# Patient Record
Sex: MALE | Race: WHITE | URBAN - METROPOLITAN AREA
[De-identification: names, ages, dates, MRNs, and addresses within clinical notes are randomized per-mention and may not be internally consistent; named-entity substitution may affect disease eponyms.]

---

## 2023-11-09 ENCOUNTER — INPATIENT (INPATIENT)
Facility: HOSPITAL | Age: 63
LOS: 0 days | Discharge: HOME | DRG: 694 | End: 2023-11-10
Attending: STUDENT IN AN ORGANIZED HEALTH CARE EDUCATION/TRAINING PROGRAM | Admitting: STUDENT IN AN ORGANIZED HEALTH CARE EDUCATION/TRAINING PROGRAM
Payer: COMMERCIAL

## 2023-11-09 VITALS
DIASTOLIC BLOOD PRESSURE: 77 MMHG | WEIGHT: 176.81 LBS | HEART RATE: 78 BPM | OXYGEN SATURATION: 98 % | SYSTOLIC BLOOD PRESSURE: 132 MMHG | TEMPERATURE: 98 F | RESPIRATION RATE: 20 BRPM

## 2023-11-09 LAB
ALBUMIN SERPL ELPH-MCNC: 3.7 G/DL — SIGNIFICANT CHANGE UP (ref 3.3–5.2)
ALP SERPL-CCNC: 92 U/L — SIGNIFICANT CHANGE UP (ref 40–120)
ALT FLD-CCNC: 20 U/L — SIGNIFICANT CHANGE UP
ANION GAP SERPL CALC-SCNC: 15 MMOL/L — SIGNIFICANT CHANGE UP (ref 5–17)
AST SERPL-CCNC: 31 U/L — SIGNIFICANT CHANGE UP
BASOPHILS # BLD AUTO: 0.03 K/UL — SIGNIFICANT CHANGE UP (ref 0–0.2)
BASOPHILS NFR BLD AUTO: 0.2 % — SIGNIFICANT CHANGE UP (ref 0–2)
BILIRUB SERPL-MCNC: 1.2 MG/DL — SIGNIFICANT CHANGE UP (ref 0.4–2)
BUN SERPL-MCNC: 19.9 MG/DL — SIGNIFICANT CHANGE UP (ref 8–20)
CALCIUM SERPL-MCNC: 8.9 MG/DL — SIGNIFICANT CHANGE UP (ref 8.4–10.5)
CHLORIDE SERPL-SCNC: 93 MMOL/L — LOW (ref 96–108)
CO2 SERPL-SCNC: 22 MMOL/L — SIGNIFICANT CHANGE UP (ref 22–29)
CREAT SERPL-MCNC: 1.69 MG/DL — HIGH (ref 0.5–1.3)
EGFR: 45 ML/MIN/1.73M2 — LOW
EOSINOPHIL # BLD AUTO: 0.01 K/UL — SIGNIFICANT CHANGE UP (ref 0–0.5)
EOSINOPHIL NFR BLD AUTO: 0.1 % — SIGNIFICANT CHANGE UP (ref 0–6)
GLUCOSE SERPL-MCNC: 118 MG/DL — HIGH (ref 70–99)
HCT VFR BLD CALC: 49.1 % — SIGNIFICANT CHANGE UP (ref 39–50)
HGB BLD-MCNC: 16.1 G/DL — SIGNIFICANT CHANGE UP (ref 13–17)
IMM GRANULOCYTES NFR BLD AUTO: 0.5 % — SIGNIFICANT CHANGE UP (ref 0–0.9)
LYMPHOCYTES # BLD AUTO: 0.86 K/UL — LOW (ref 1–3.3)
LYMPHOCYTES # BLD AUTO: 5 % — LOW (ref 13–44)
MCHC RBC-ENTMCNC: 27.4 PG — SIGNIFICANT CHANGE UP (ref 27–34)
MCHC RBC-ENTMCNC: 32.8 GM/DL — SIGNIFICANT CHANGE UP (ref 32–36)
MCV RBC AUTO: 83.5 FL — SIGNIFICANT CHANGE UP (ref 80–100)
MONOCYTES # BLD AUTO: 1.28 K/UL — HIGH (ref 0–0.9)
MONOCYTES NFR BLD AUTO: 7.4 % — SIGNIFICANT CHANGE UP (ref 2–14)
NEUTROPHILS # BLD AUTO: 14.97 K/UL — HIGH (ref 1.8–7.4)
NEUTROPHILS NFR BLD AUTO: 86.8 % — HIGH (ref 43–77)
PLATELET # BLD AUTO: 281 K/UL — SIGNIFICANT CHANGE UP (ref 150–400)
POTASSIUM SERPL-MCNC: 4.8 MMOL/L — SIGNIFICANT CHANGE UP (ref 3.5–5.3)
POTASSIUM SERPL-SCNC: 4.8 MMOL/L — SIGNIFICANT CHANGE UP (ref 3.5–5.3)
PROT SERPL-MCNC: 7.9 G/DL — SIGNIFICANT CHANGE UP (ref 6.6–8.7)
RBC # BLD: 5.88 M/UL — HIGH (ref 4.2–5.8)
RBC # FLD: 16.9 % — HIGH (ref 10.3–14.5)
SODIUM SERPL-SCNC: 130 MMOL/L — LOW (ref 135–145)
WBC # BLD: 17.23 K/UL — HIGH (ref 3.8–10.5)
WBC # FLD AUTO: 17.23 K/UL — HIGH (ref 3.8–10.5)

## 2023-11-09 PROCEDURE — 76770 US EXAM ABDO BACK WALL COMP: CPT | Mod: 26

## 2023-11-09 PROCEDURE — 99285 EMERGENCY DEPT VISIT HI MDM: CPT | Mod: FS

## 2023-11-09 RX ORDER — KETOROLAC TROMETHAMINE 30 MG/ML
15 SYRINGE (ML) INJECTION ONCE
Refills: 0 | Status: DISCONTINUED | OUTPATIENT
Start: 2023-11-09 | End: 2023-11-09

## 2023-11-09 RX ORDER — MORPHINE SULFATE 50 MG/1
4 CAPSULE, EXTENDED RELEASE ORAL ONCE
Refills: 0 | Status: DISCONTINUED | OUTPATIENT
Start: 2023-11-09 | End: 2023-11-09

## 2023-11-09 RX ADMIN — Medication 15 MILLIGRAM(S): at 22:23

## 2023-11-09 RX ADMIN — MORPHINE SULFATE 4 MILLIGRAM(S): 50 CAPSULE, EXTENDED RELEASE ORAL at 22:21

## 2023-11-09 NOTE — ED STATDOCS - OBJECTIVE STATEMENT
64 y/o male with PMHx of MS, previous kidney stones with recent diagnoses of 7 mm kidney stone. Pt states pain started yesterday. Pt went to [facility] today and was sent for Ct scan around 3:00 pm. Pt was giving hydrocodone by urologist. Pt states he has some trouble urinating. Pt states his urine is orange in color. Pt states he is nauseous,  Pt  took 3 hydrocodone without improvement in pain.  Pt had a stone before that was treated with lithotripsy. 64 y/o male with PMHx of MS, previous kidney stones with recent diagnoses of 7 mm kidney stone. Pt states pain started yesterday. Pt went to [facility] today and was sent for Ct scan around 3:00 pm. Pt was giving hydrocodone by urologist. Pt states he has some trouble urinating. Pt states his urine is orange in color. Pt states he is nauseous,  Pt  took 3 hydrocodone without improvement in pain.  Pt had a stone before that was treated with lithotripsy. 64 y/o male with PMHx of MS, previous kidney stones with recent diagnoses of 7 mm kidney stone. Pt states pain started yesterday. Pt went to [facility] today and was sent for Ct scan around 3:00 pm. Pt was giving hydrocodone by urologist. Pt states he has some trouble urinating. Pt states his urine is orange in color. Pt states he is nauseous,  Pt  took 3 hydrocodone without improvement in pain.  Pt had a stone before that was treated with lithotripsy. 64 y/o male with PMHx of MS, previous kidney stones with recent diagnoses of 7x5mm kidney stone in the proximal ureter. Pt states pain started yesterday. Pt went to [facility] today and was sent for Ct scan around 3:00 pm. Pt was giving hydrocodone by urologist. Pt states he has some trouble urinating. Pt states his urine is orange in color. Pt states he is nauseous,  Pt  took 3 hydrocodone without improvement in pain.  Pt had a stone before that was treated with lithotripsy. 64 y/o male with PMHx of MS, previous kidney stones with recent diagnoses of 7x5mm kidney stone in the proximal ureter. Pt states pain started yesterday. Pt went to [facility] today and was sent for Ct scan around 3:00 pm. Pt was giving hydrocodone by urologist. Pt states he has some trouble urinating. Pt states his urine is orange in color. Pt states he is nauseous,  Pt  took 3 hydrocodone without improvement in pain.  Pt had a stone before that was treated with lithotripsy. 62 y/o male with PMHx of MS, previous kidney stones with recent diagnoses of 7x5mm kidney stone in the proximal ureter. Pt states pain started yesterday. Pt went to [facility] today and was sent for Ct scan around 3:00 pm. Pt was giving hydrocodone by urologist. Pt states he has some trouble urinating. Pt states his urine is orange in color. Pt states he is nauseous,  Pt  took 3 hydrocodone without improvement in pain.  Pt had a stone before that was treated with lithotripsy. 64 y/o male with PMHx of MS, previous kidney stones with recent diagnoses of 7x5mm kidney stone in the proximal ureter. Pt states pain started yesterday. Pt went to [facility] today and was sent for Ct scan around 3:00 pm. Pt was giving hydrocodone by urologist. Pt states he has some trouble urinating. Pt states his urine is orange in color. Pt states he is nauseous,  Pt took 3 hydrocodone without improvement in pain.  Pt had a stone before that was treated with lithotripsy. 64 y/o male with PMHx of MS, previous kidney stones with recent diagnoses of 7x5mm kidney stone in the proximal ureter. Pt states pain started yesterday. Pt went to [facility] today and was sent for Ct scan around 3:00 pm. Pt was giving hydrocodone by urologist. Pt states he has some trouble urinating. Pt states his urine is orange in color. Pt states he is nauseous,  Pt took 3 hydrocodone without improvement in pain.  Pt had a stone before that was treated with lithotripsy. 62 y/o male with PMHx of MS, previous kidney stones with recent diagnoses of 7x5mm kidney stone in the proximal ureter. Pt states pain started yesterday. Pt went to [facility] today and was sent for Ct scan around 3:00 pm. Pt was giving hydrocodone by urologist. Pt states he has some trouble urinating. Pt states his urine is orange in color. Pt states he is nauseous,  Pt took 3 hydrocodone without improvement in pain.  Pt had a stone before that was treated with lithotripsy.

## 2023-11-09 NOTE — ED STATDOCS - CLINICAL SUMMARY MEDICAL DECISION MAKING FREE TEXT BOX
Pt with recent diagnose of 7mm right kidney stone presenting with intractable pain. Will obtain UA, sonogram, pain control and reassess.

## 2023-11-10 ENCOUNTER — TRANSCRIPTION ENCOUNTER (OUTPATIENT)
Age: 63
End: 2023-11-10

## 2023-11-10 VITALS
DIASTOLIC BLOOD PRESSURE: 78 MMHG | TEMPERATURE: 99 F | SYSTOLIC BLOOD PRESSURE: 152 MMHG | RESPIRATION RATE: 18 BRPM | HEART RATE: 70 BPM | OXYGEN SATURATION: 97 %

## 2023-11-10 DIAGNOSIS — N20.0 CALCULUS OF KIDNEY: ICD-10-CM

## 2023-11-10 PROBLEM — Z00.00 ENCOUNTER FOR PREVENTIVE HEALTH EXAMINATION: Status: ACTIVE | Noted: 2023-11-10

## 2023-11-10 LAB
ANION GAP SERPL CALC-SCNC: 10 MMOL/L — SIGNIFICANT CHANGE UP (ref 5–17)
APPEARANCE UR: CLEAR — SIGNIFICANT CHANGE UP
BACTERIA # UR AUTO: NEGATIVE /HPF — SIGNIFICANT CHANGE UP
BILIRUB UR-MCNC: NEGATIVE — SIGNIFICANT CHANGE UP
BUN SERPL-MCNC: 19 MG/DL — SIGNIFICANT CHANGE UP (ref 8–20)
CALCIUM SERPL-MCNC: 8.3 MG/DL — LOW (ref 8.4–10.5)
CAST: 0 /LPF — SIGNIFICANT CHANGE UP (ref 0–4)
CHLORIDE SERPL-SCNC: 98 MMOL/L — SIGNIFICANT CHANGE UP (ref 96–108)
CO2 SERPL-SCNC: 27 MMOL/L — SIGNIFICANT CHANGE UP (ref 22–29)
COLOR SPEC: YELLOW — SIGNIFICANT CHANGE UP
CREAT SERPL-MCNC: 1.66 MG/DL — HIGH (ref 0.5–1.3)
DIFF PNL FLD: ABNORMAL
EGFR: 46 ML/MIN/1.73M2 — LOW
GLUCOSE SERPL-MCNC: 99 MG/DL — SIGNIFICANT CHANGE UP (ref 70–99)
GLUCOSE UR QL: NEGATIVE MG/DL — SIGNIFICANT CHANGE UP
HCT VFR BLD CALC: 43.5 % — SIGNIFICANT CHANGE UP (ref 39–50)
HGB BLD-MCNC: 14.6 G/DL — SIGNIFICANT CHANGE UP (ref 13–17)
KETONES UR-MCNC: NEGATIVE MG/DL — SIGNIFICANT CHANGE UP
LEUKOCYTE ESTERASE UR-ACNC: NEGATIVE — SIGNIFICANT CHANGE UP
MCHC RBC-ENTMCNC: 28.6 PG — SIGNIFICANT CHANGE UP (ref 27–34)
MCHC RBC-ENTMCNC: 33.6 GM/DL — SIGNIFICANT CHANGE UP (ref 32–36)
MCV RBC AUTO: 85.1 FL — SIGNIFICANT CHANGE UP (ref 80–100)
NITRITE UR-MCNC: NEGATIVE — SIGNIFICANT CHANGE UP
PH UR: 6.5 — SIGNIFICANT CHANGE UP (ref 5–8)
PLATELET # BLD AUTO: 231 K/UL — SIGNIFICANT CHANGE UP (ref 150–400)
POTASSIUM SERPL-MCNC: 4.3 MMOL/L — SIGNIFICANT CHANGE UP (ref 3.5–5.3)
POTASSIUM SERPL-SCNC: 4.3 MMOL/L — SIGNIFICANT CHANGE UP (ref 3.5–5.3)
PROT UR-MCNC: NEGATIVE MG/DL — SIGNIFICANT CHANGE UP
RBC # BLD: 5.11 M/UL — SIGNIFICANT CHANGE UP (ref 4.2–5.8)
RBC # FLD: 16.9 % — HIGH (ref 10.3–14.5)
RBC CASTS # UR COMP ASSIST: 0 /HPF — SIGNIFICANT CHANGE UP (ref 0–4)
SODIUM SERPL-SCNC: 135 MMOL/L — SIGNIFICANT CHANGE UP (ref 135–145)
SP GR SPEC: 1.01 — SIGNIFICANT CHANGE UP (ref 1–1.03)
SQUAMOUS # UR AUTO: 0 /HPF — SIGNIFICANT CHANGE UP (ref 0–5)
UROBILINOGEN FLD QL: 0.2 MG/DL — SIGNIFICANT CHANGE UP (ref 0.2–1)
WBC # BLD: 13.47 K/UL — HIGH (ref 3.8–10.5)
WBC # FLD AUTO: 13.47 K/UL — HIGH (ref 3.8–10.5)
WBC UR QL: 0 /HPF — SIGNIFICANT CHANGE UP (ref 0–5)

## 2023-11-10 PROCEDURE — 76770 US EXAM ABDO BACK WALL COMP: CPT

## 2023-11-10 PROCEDURE — 81001 URINALYSIS AUTO W/SCOPE: CPT

## 2023-11-10 PROCEDURE — 99233 SBSQ HOSP IP/OBS HIGH 50: CPT | Mod: FS

## 2023-11-10 PROCEDURE — 96374 THER/PROPH/DIAG INJ IV PUSH: CPT

## 2023-11-10 PROCEDURE — 96375 TX/PRO/DX INJ NEW DRUG ADDON: CPT

## 2023-11-10 PROCEDURE — 80048 BASIC METABOLIC PNL TOTAL CA: CPT

## 2023-11-10 PROCEDURE — 36415 COLL VENOUS BLD VENIPUNCTURE: CPT

## 2023-11-10 PROCEDURE — 99285 EMERGENCY DEPT VISIT HI MDM: CPT | Mod: 25

## 2023-11-10 PROCEDURE — 80053 COMPREHEN METABOLIC PANEL: CPT

## 2023-11-10 PROCEDURE — 85025 COMPLETE CBC W/AUTO DIFF WBC: CPT

## 2023-11-10 PROCEDURE — 85027 COMPLETE CBC AUTOMATED: CPT

## 2023-11-10 PROCEDURE — 87086 URINE CULTURE/COLONY COUNT: CPT

## 2023-11-10 RX ORDER — ACETAMINOPHEN 500 MG
1000 TABLET ORAL ONCE
Refills: 0 | Status: DISCONTINUED | OUTPATIENT
Start: 2023-11-10 | End: 2023-11-10

## 2023-11-10 RX ORDER — HYDROMORPHONE HYDROCHLORIDE 2 MG/ML
1 INJECTION INTRAMUSCULAR; INTRAVENOUS; SUBCUTANEOUS EVERY 6 HOURS
Refills: 0 | Status: DISCONTINUED | OUTPATIENT
Start: 2023-11-10 | End: 2023-11-10

## 2023-11-10 RX ORDER — TAMSULOSIN HYDROCHLORIDE 0.4 MG/1
1 CAPSULE ORAL
Qty: 30 | Refills: 0
Start: 2023-11-10 | End: 2023-12-09

## 2023-11-10 RX ORDER — SODIUM CHLORIDE 9 MG/ML
1000 INJECTION, SOLUTION INTRAVENOUS
Refills: 0 | Status: DISCONTINUED | OUTPATIENT
Start: 2023-11-10 | End: 2023-11-10

## 2023-11-10 RX ORDER — HYDROMORPHONE HYDROCHLORIDE 2 MG/ML
0.5 INJECTION INTRAMUSCULAR; INTRAVENOUS; SUBCUTANEOUS EVERY 6 HOURS
Refills: 0 | Status: DISCONTINUED | OUTPATIENT
Start: 2023-11-10 | End: 2023-11-10

## 2023-11-10 RX ORDER — HYDROMORPHONE HYDROCHLORIDE 2 MG/ML
0.2 INJECTION INTRAMUSCULAR; INTRAVENOUS; SUBCUTANEOUS EVERY 6 HOURS
Refills: 0 | Status: DISCONTINUED | OUTPATIENT
Start: 2023-11-10 | End: 2023-11-10

## 2023-11-10 RX ORDER — IBUPROFEN 200 MG
600 TABLET ORAL EVERY 6 HOURS
Refills: 0 | Status: DISCONTINUED | OUTPATIENT
Start: 2023-11-10 | End: 2023-11-10

## 2023-11-10 RX ORDER — CEPHALEXIN 500 MG
1 CAPSULE ORAL
Qty: 9 | Refills: 0
Start: 2023-11-10 | End: 2023-11-12

## 2023-11-10 RX ORDER — KETOROLAC TROMETHAMINE 30 MG/ML
15 SYRINGE (ML) INJECTION ONCE
Refills: 0 | Status: DISCONTINUED | OUTPATIENT
Start: 2023-11-10 | End: 2023-11-10

## 2023-11-10 RX ORDER — ASPIRIN/CALCIUM CARB/MAGNESIUM 324 MG
81 TABLET ORAL DAILY
Refills: 0 | Status: DISCONTINUED | OUTPATIENT
Start: 2023-11-10 | End: 2023-11-10

## 2023-11-10 RX ORDER — KETOROLAC TROMETHAMINE 30 MG/ML
15 SYRINGE (ML) INJECTION EVERY 6 HOURS
Refills: 0 | Status: DISCONTINUED | OUTPATIENT
Start: 2023-11-10 | End: 2023-11-10

## 2023-11-10 RX ORDER — TAMSULOSIN HYDROCHLORIDE 0.4 MG/1
0.4 CAPSULE ORAL AT BEDTIME
Refills: 0 | Status: DISCONTINUED | OUTPATIENT
Start: 2023-11-10 | End: 2023-11-10

## 2023-11-10 RX ADMIN — Medication 81 MILLIGRAM(S): at 12:44

## 2023-11-10 RX ADMIN — SODIUM CHLORIDE 125 MILLILITER(S): 9 INJECTION, SOLUTION INTRAVENOUS at 06:17

## 2023-11-10 NOTE — DISCHARGE NOTE PROVIDER - NSDCMRMEDTOKEN_GEN_ALL_CORE_FT
cephalexin 500 mg oral capsule: 1 cap(s) orally 3 times a day  ibuprofen 200 mg oral tablet: 3 tab(s) orally every 6 hours as needed for pain  tamsulosin 0.4 mg oral capsule: 1 cap(s) orally once a day (at bedtime)   ibuprofen 200 mg oral tablet: 3 tab(s) orally every 6 hours as needed for pain  tamsulosin 0.4 mg oral capsule: 1 cap(s) orally once a day (at bedtime)

## 2023-11-10 NOTE — H&P ADULT - HISTORY OF PRESENT ILLNESS
Patient is a 62 yo M with mhx of MS, and polycythemia vera, which he goes 1x a month for phlebotomy. Patient states 2 days ago he was having abdominal pain that gradually went to his flank. He had associated nausea and vomiting. He went to his urologist at [facility] yesterday who gave him oxycodone and to get a CT which showed hydronephrosis and a 7mm prox R ureteral stone     He had a previous lithotripsy done and brain aneurysm meshed at [place], takes aspiring 81 daily.  Patient is a 62 yo M with mhx of MS, and polycythemia vera, which he goes 1x a month for phlebotomy. Patient states 2 days ago he was having abdominal pain that gradually went to his flank. He had associated nausea and vomiting. He went to his urologist at [facility] yesterday who gave him oxycodone and to get a CT which showed hydronephrosis and a 7mm prox R ureteral stone     He had a previous lithotripsy done and brain aneurysm meshed at [place], takes aspiring 81 daily.  Patient is a 62 yo M with mhx of MS, and polycythemia vera, which he goes 1x a month for phlebotomy. Patient states 2 days ago he was having abdominal pain that gradually went to his flank. He had associated nausea and vomiting. He went to his urologist at [facility] yesterday who gave him oxycodone and to get a CT which showed hydronephrosis and a 7mm prox R ureteral stone     He had a previous lithotripsy done and brain aneurysm meshed at [place], takes aspiring 81 daily.  Patient is a 64 yo M with mhx of MS, and polycythemia vera, which he goes 1x a month for phlebotomy. Patient states 2 days ago he was having abdominal pain that gradually went to his flank. He had associated nausea and vomiting. He went to his urologist at [facility] yesterday who gave him oxycodone and to get a CT which showed hydronephrosis and a 7mm prox R ureteral stone. In the ED his wbc is 17, Cr is 1.69. He is afebrile and hemodynamically stable.     He had a previous lithotripsy done and brain aneurysm meshed at [place], takes aspiring 81 daily.  Patient is a 62 yo M with mhx of MS, and polycythemia vera, which he goes 1x a month for phlebotomy. Patient states 2 days ago he was having abdominal pain that gradually went to his flank. He had associated nausea and vomiting. He went to his urologist at [facility] yesterday who gave him oxycodone and to get a CT which showed hydronephrosis and a 7mm prox R ureteral stone. In the ED his wbc is 17, Cr is 1.69. He is afebrile and hemodynamically stable.     He had a previous lithotripsy done and brain aneurysm meshed at [place], takes aspiring 81 daily.  Patient is a 62 yo M with mhx of MS, and polycythemia vera, which he goes 1x a month for phlebotomy. Patient states 2 days ago he was having abdominal pain that gradually went to his flank. He had associated nausea and vomiting. He went to his urologist at [facility] yesterday who gave him oxycodone and to get a CT which showed hydronephrosis and a 7mm prox R ureteral stone. In the ED his wbc is 17, Cr is 1.69. He is afebrile and hemodynamically stable.     He had a previous lithotripsy done and brain aneurysm meshed at [place], takes aspiring 81 daily.

## 2023-11-10 NOTE — PROGRESS NOTE ADULT - SUBJECTIVE AND OBJECTIVE BOX
Subjective:63yMale admitted with right renal colic.  Pt feeling better today, no n/v, receiving IVF, voiding well.        Vital Signs Last 24 Hrs  T(C): 37.2 (10 Nov 2023 08:07), Max: 37.2 (10 Nov 2023 08:07)  T(F): 99 (10 Nov 2023 08:07), Max: 99 (10 Nov 2023 08:07)  HR: 66 (10 Nov 2023 08:07) (66 - 78)  BP: 159/85 (10 Nov 2023 08:07) (131/80 - 159/85)  BP(mean): --  RR: 17 (10 Nov 2023 08:07) (15 - 20)  SpO2: 96% (10 Nov 2023 08:07) (94% - 98%)    Parameters below as of 10 Nov 2023 08:07  Patient On (Oxygen Delivery Method): room air      I&O's Detail      Labs:                        16.1   17.23 )-----------( 281      ( 09 Nov 2023 22:15 )             49.1     11-09    130<L>  |  93<L>  |  19.9  ----------------------------<  118<H>  4.8   |  22.0  |  1.69<H>    Ca    8.9      09 Nov 2023 22:15    TPro  7.9  /  Alb  3.7  /  TBili  1.2  /  DBili  x   /  AST  31  /  ALT  20  /  AlkPhos  92  11-09

## 2023-11-10 NOTE — DISCHARGE NOTE PROVIDER - PROVIDER TOKENS
FREE:[LAST:[Saba],FIRST:[Sang],PHONE:[phone]],FAX:[(   )    -],ADDRESS:[[address] [place] [address]],FOLLOWUP:[1 week]] FREE:[LAST:[Saba],FIRST:[Sang],PHONE:[phone]],FAX:[(   )    -],ADDRESS:[[address] [place] [address]],FOLLOWUP:[1 week]] FREE:[LAST:[Saba],FIRST:[Sang],PHONE:[phone]],FAX:[(   )    -],ADDRESS:[[address] [place] [address]],FOLLOWUP:[1 week]] FREE:[LAST:[Saba],FIRST:[Sang],PHONE:[phone]],FAX:[(   )    -],ADDRESS:[[address] [place] [address]],SCHEDULEDAPPT:[11/15/2023]] FREE:[LAST:[Saba],FIRST:[Sang],PHONE:[phone]],FAX:[(   )    -],ADDRESS:[[address] [place] [address]],SCHEDULEDAPPT:[11/15/2023]] FREE:[LAST:[Saba],FIRST:[Sang],PHONE:[phone]],FAX:[(   )    -],ADDRESS:[[address] [place] [address]],SCHEDULEDAPPT:[11/15/2023]]

## 2023-11-10 NOTE — DISCHARGE NOTE PROVIDER - CARE PROVIDER_API CALL
Sang Walter   [address] [place], [place]  Phone: [phone]  Fax: (   )    -  Follow Up Time: 1 week   Sang Walter   [address]. [place], [place]  Phone: [phone]  Fax: (   )    -  Follow Up Time: 1 week   Sang Walter   [address]. [place], [place]  Phone: [phone]  Fax: (   )    -  Follow Up Time: 1 week   Sang Walter   [address]. [place], [place]  Phone: [phone]  Fax: (   )    -  Scheduled Appointment: 11/15/2023   Sang Walter   [address] [place], [place]  Phone: [phone]  Fax: (   )    -  Scheduled Appointment: 11/15/2023   Sang Walter   [address]. [place], [place]  Phone: [phone]  Fax: (   )    -  Scheduled Appointment: 11/15/2023

## 2023-11-10 NOTE — PROGRESS NOTE ADULT - ASSESSMENT
62 yo male with right renal colic, right ureteral stone  - reg diet  - PO pain meds  - d/c home today  - f/u in office with Dr. Walter 64 yo male with right renal colic, right ureteral stone  - reg diet  - PO pain meds  - d/c home today  - f/u in office with Dr. Walter

## 2023-11-10 NOTE — ED ADULT NURSE REASSESSMENT NOTE - NS ED NURSE REASSESS COMMENT FT1
Report given to Yovana YODER in CDU. VSS. Respirations even and unlabored. No complaints at this time. Care transferred.

## 2023-11-10 NOTE — DISCHARGE NOTE NURSING/CASE MANAGEMENT/SOCIAL WORK - NSDCPEFALRISK_GEN_ALL_CORE
For information on Fall & Injury Prevention, visit: [email or web address]-mobility OR  [email or web address]://www.[email or web address]-[email or web address]-injury OR  https://www.cdc.gov/steadi/patient.html For information on Fall & Injury Prevention, visit: [email or web address]://www.[facility]/news/[email or web address]-[email or web address]-health-[email or web address]-mobility OR  https://www.[facility]/news/fall-prevention-tips-[email or web address]-injury OR  https://www.cdc.gov/steadi/patient.html For information on Fall & Injury Prevention, visit: [email or web address]://www.[facility]/news/fall-prevention-protects-and-maintains-health-and-mobility OR  https://www.[facility]/news/fall-prevention-tips-to-avoid-injury OR  https://www.cdc.gov/steadi/patient.html

## 2023-11-10 NOTE — DISCHARGE NOTE PROVIDER - NSDCCPCAREPLAN_GEN_ALL_CORE_FT
PRINCIPAL DISCHARGE DIAGNOSIS  Diagnosis: Kidney stone  Assessment and Plan of Treatment: - drink plenty of fluids  - take antibiotics until finished  - take ibuprofen with food or milk  - strain urine for stone     PRINCIPAL DISCHARGE DIAGNOSIS  Diagnosis: Kidney stone  Assessment and Plan of Treatment: - drink plenty of fluids  - take ibuprofen with food or milk  - strain urine for stone

## 2023-11-10 NOTE — H&P ADULT - ASSESSMENT
A: Patient is a 66 yo M with a 7mm R prox ureteral stone.     Plan:   chem dvt ppx held in prep of possible OR   NPO, IVFs   pain control prn   anti emetics prn   home meds restarted   ambulation oob   IS   SCDs

## 2023-11-10 NOTE — DISCHARGE NOTE PROVIDER - HOSPITAL COURSE
64 yo male with right flank pain, had an OP CT which showed a 7mm right ureteral stone.  pt presented with nausea, vomiting and right flank pain.  pt was admitted for IVF, pain control and antiemetics.  Pt felt much better on hospital day #1.  pt was given a diet, tolerated it well, continued to void, remained afebrile and is stable for d/c to ome.  pt would like to follow up with Dr. Walter for future stone management. 62 yo male with right flank pain, had an OP CT which showed a 7mm right ureteral stone.  pt presented with nausea, vomiting and right flank pain.  pt was admitted for IVF, pain control and antiemetics.  Pt felt much better on hospital day #1.  pt was given a diet, tolerated it well, continued to void, remained afebrile and is stable for d/c to ome.  pt would like to follow up with Dr. Walter for future stone management.

## 2023-11-10 NOTE — DISCHARGE NOTE NURSING/CASE MANAGEMENT/SOCIAL WORK - PATIENT PORTAL LINK FT
You can access the FollowMyHealth Patient Portal offered by [facility] by registering at the following website: [email or web address]. By joining [employer]’s FollowMyHealth portal, you will also be able to view your health information using other applications (apps) compatible with our system. You can access the FollowMyHealth Patient Portal offered by [facility] by registering at the following website: [email or web address]. By joining [employer]’s FollowMyHealth portal, you will also be able to view your health information using other applications (apps) compatible with our system. You can access the FollowMyHealth Patient Portal offered by [facility] by registering at the following website: [email or web address]. By joining [employer]’s FollowMyHealth portal, you will also be able to view your health information using other applications (apps) compatible with our system.

## 2023-11-10 NOTE — DISCHARGE NOTE PROVIDER - NSDCACTIVITY_GEN_ALL_CORE
No restrictions/Return to Work/School allowed/Showering allowed/Stairs allowed/Walking - Indoors allowed/Walking - Outdoors allowed

## 2023-11-11 LAB
CULTURE RESULTS: NO GROWTH — SIGNIFICANT CHANGE UP
SPECIMEN SOURCE: SIGNIFICANT CHANGE UP

## 2023-11-15 ENCOUNTER — APPOINTMENT (OUTPATIENT)
Dept: UROLOGY | Facility: CLINIC | Age: 63
End: 2023-11-15
Payer: MEDICARE

## 2023-11-15 VITALS
HEART RATE: 63 BPM | SYSTOLIC BLOOD PRESSURE: 165 MMHG | DIASTOLIC BLOOD PRESSURE: 92 MMHG | HEIGHT: 69 IN | WEIGHT: 177 LBS | BODY MASS INDEX: 26.22 KG/M2

## 2023-11-15 PROCEDURE — 99204 OFFICE O/P NEW MOD 45 MIN: CPT

## 2023-11-15 PROCEDURE — 99214 OFFICE O/P EST MOD 30 MIN: CPT

## 2023-11-15 RX ORDER — TAMSULOSIN HYDROCHLORIDE 0.4 MG/1
0.4 CAPSULE ORAL
Qty: 90 | Refills: 3 | Status: ACTIVE | COMMUNITY
Start: 2023-11-15 | End: 1900-01-01

## 2023-11-16 ENCOUNTER — INPATIENT (INPATIENT)
Facility: HOSPITAL | Age: 63
LOS: 1 days | Discharge: HOME | DRG: 661 | End: 2023-11-18
Attending: STUDENT IN AN ORGANIZED HEALTH CARE EDUCATION/TRAINING PROGRAM | Admitting: STUDENT IN AN ORGANIZED HEALTH CARE EDUCATION/TRAINING PROGRAM
Payer: MEDICARE

## 2023-11-16 ENCOUNTER — NON-APPOINTMENT (OUTPATIENT)
Age: 63
End: 2023-11-16

## 2023-11-16 ENCOUNTER — TRANSCRIPTION ENCOUNTER (OUTPATIENT)
Age: 63
End: 2023-11-16

## 2023-11-16 VITALS
HEART RATE: 81 BPM | HEIGHT: 69 IN | SYSTOLIC BLOOD PRESSURE: 115 MMHG | DIASTOLIC BLOOD PRESSURE: 74 MMHG | OXYGEN SATURATION: 95 % | RESPIRATION RATE: 18 BRPM | TEMPERATURE: 98 F | WEIGHT: 171.96 LBS

## 2023-11-16 DIAGNOSIS — N20.0 CALCULUS OF KIDNEY: ICD-10-CM

## 2023-11-16 LAB
ALBUMIN SERPL ELPH-MCNC: 3.5 G/DL — SIGNIFICANT CHANGE UP (ref 3.3–5.2)
ALP SERPL-CCNC: 95 U/L — SIGNIFICANT CHANGE UP (ref 40–120)
ALT FLD-CCNC: 53 U/L — HIGH
ANION GAP SERPL CALC-SCNC: 10 MMOL/L — SIGNIFICANT CHANGE UP (ref 5–17)
APPEARANCE UR: CLEAR — SIGNIFICANT CHANGE UP
APTT BLD: 33.7 SEC — SIGNIFICANT CHANGE UP (ref 24.5–35.6)
AST SERPL-CCNC: 50 U/L — HIGH
BACTERIA # UR AUTO: NEGATIVE /HPF — SIGNIFICANT CHANGE UP
BASOPHILS # BLD AUTO: 0.05 K/UL — SIGNIFICANT CHANGE UP (ref 0–0.2)
BASOPHILS NFR BLD AUTO: 0.6 % — SIGNIFICANT CHANGE UP (ref 0–2)
BILIRUB SERPL-MCNC: 0.5 MG/DL — SIGNIFICANT CHANGE UP (ref 0.4–2)
BILIRUB UR-MCNC: NEGATIVE — SIGNIFICANT CHANGE UP
BLD GP AB SCN SERPL QL: SIGNIFICANT CHANGE UP
BUN SERPL-MCNC: 25.8 MG/DL — HIGH (ref 8–20)
CALCIUM SERPL-MCNC: 10.2 MG/DL — SIGNIFICANT CHANGE UP (ref 8.4–10.5)
CAST: 1 /LPF — SIGNIFICANT CHANGE UP (ref 0–4)
CHLORIDE SERPL-SCNC: 99 MMOL/L — SIGNIFICANT CHANGE UP (ref 96–108)
CO2 SERPL-SCNC: 25 MMOL/L — SIGNIFICANT CHANGE UP (ref 22–29)
COLOR SPEC: YELLOW — SIGNIFICANT CHANGE UP
CREAT SERPL-MCNC: 1.64 MG/DL — HIGH (ref 0.5–1.3)
DIFF PNL FLD: ABNORMAL
EGFR: 47 ML/MIN/1.73M2 — LOW
EOSINOPHIL # BLD AUTO: 0.12 K/UL — SIGNIFICANT CHANGE UP (ref 0–0.5)
EOSINOPHIL NFR BLD AUTO: 1.3 % — SIGNIFICANT CHANGE UP (ref 0–6)
GLUCOSE SERPL-MCNC: 122 MG/DL — HIGH (ref 70–99)
GLUCOSE UR QL: NEGATIVE MG/DL — SIGNIFICANT CHANGE UP
HCT VFR BLD CALC: 44.8 % — SIGNIFICANT CHANGE UP (ref 39–50)
HGB BLD-MCNC: 15.3 G/DL — SIGNIFICANT CHANGE UP (ref 13–17)
IMM GRANULOCYTES NFR BLD AUTO: 0.4 % — SIGNIFICANT CHANGE UP (ref 0–0.9)
INR BLD: 1.05 RATIO — SIGNIFICANT CHANGE UP (ref 0.85–1.18)
KETONES UR-MCNC: ABNORMAL MG/DL
LEUKOCYTE ESTERASE UR-ACNC: NEGATIVE — SIGNIFICANT CHANGE UP
LYMPHOCYTES # BLD AUTO: 1.32 K/UL — SIGNIFICANT CHANGE UP (ref 1–3.3)
LYMPHOCYTES # BLD AUTO: 14.6 % — SIGNIFICANT CHANGE UP (ref 13–44)
MCHC RBC-ENTMCNC: 28.5 PG — SIGNIFICANT CHANGE UP (ref 27–34)
MCHC RBC-ENTMCNC: 34.2 GM/DL — SIGNIFICANT CHANGE UP (ref 32–36)
MCV RBC AUTO: 83.4 FL — SIGNIFICANT CHANGE UP (ref 80–100)
MONOCYTES # BLD AUTO: 0.83 K/UL — SIGNIFICANT CHANGE UP (ref 0–0.9)
MONOCYTES NFR BLD AUTO: 9.2 % — SIGNIFICANT CHANGE UP (ref 2–14)
NEUTROPHILS # BLD AUTO: 6.69 K/UL — SIGNIFICANT CHANGE UP (ref 1.8–7.4)
NEUTROPHILS NFR BLD AUTO: 73.9 % — SIGNIFICANT CHANGE UP (ref 43–77)
NITRITE UR-MCNC: NEGATIVE — SIGNIFICANT CHANGE UP
PH UR: 5.5 — SIGNIFICANT CHANGE UP (ref 5–8)
PLATELET # BLD AUTO: 341 K/UL — SIGNIFICANT CHANGE UP (ref 150–400)
POTASSIUM SERPL-MCNC: 4.2 MMOL/L — SIGNIFICANT CHANGE UP (ref 3.5–5.3)
POTASSIUM SERPL-SCNC: 4.2 MMOL/L — SIGNIFICANT CHANGE UP (ref 3.5–5.3)
PROT SERPL-MCNC: 7.6 G/DL — SIGNIFICANT CHANGE UP (ref 6.6–8.7)
PROT UR-MCNC: SIGNIFICANT CHANGE UP MG/DL
PROTHROM AB SERPL-ACNC: 11.6 SEC — SIGNIFICANT CHANGE UP (ref 9.5–13)
RBC # BLD: 5.37 M/UL — SIGNIFICANT CHANGE UP (ref 4.2–5.8)
RBC # FLD: 16.2 % — HIGH (ref 10.3–14.5)
RBC CASTS # UR COMP ASSIST: 1 /HPF — SIGNIFICANT CHANGE UP (ref 0–4)
SODIUM SERPL-SCNC: 134 MMOL/L — LOW (ref 135–145)
SP GR SPEC: 1.02 — SIGNIFICANT CHANGE UP (ref 1–1.03)
SQUAMOUS # UR AUTO: 0 /HPF — SIGNIFICANT CHANGE UP (ref 0–5)
UROBILINOGEN FLD QL: 1 MG/DL — SIGNIFICANT CHANGE UP (ref 0.2–1)
WBC # BLD: 9.05 K/UL — SIGNIFICANT CHANGE UP (ref 3.8–10.5)
WBC # FLD AUTO: 9.05 K/UL — SIGNIFICANT CHANGE UP (ref 3.8–10.5)
WBC UR QL: 0 /HPF — SIGNIFICANT CHANGE UP (ref 0–5)

## 2023-11-16 PROCEDURE — 99283 EMERGENCY DEPT VISIT LOW MDM: CPT | Mod: FS

## 2023-11-16 PROCEDURE — 99285 EMERGENCY DEPT VISIT HI MDM: CPT | Mod: FS

## 2023-11-16 RX ORDER — LANOLIN ALCOHOL/MO/W.PET/CERES
5 CREAM (GRAM) TOPICAL AT BEDTIME
Refills: 0 | Status: DISCONTINUED | OUTPATIENT
Start: 2023-11-16 | End: 2023-11-18

## 2023-11-16 RX ORDER — SODIUM CHLORIDE 9 MG/ML
1000 INJECTION INTRAMUSCULAR; INTRAVENOUS; SUBCUTANEOUS ONCE
Refills: 0 | Status: COMPLETED | OUTPATIENT
Start: 2023-11-16 | End: 2023-11-16

## 2023-11-16 RX ORDER — KETOROLAC TROMETHAMINE 30 MG/ML
15 SYRINGE (ML) INJECTION EVERY 6 HOURS
Refills: 0 | Status: DISCONTINUED | OUTPATIENT
Start: 2023-11-16 | End: 2023-11-18

## 2023-11-16 RX ORDER — ACETAMINOPHEN 500 MG
1000 TABLET ORAL ONCE
Refills: 0 | Status: COMPLETED | OUTPATIENT
Start: 2023-11-17 | End: 2023-11-17

## 2023-11-16 RX ORDER — SENNA PLUS 8.6 MG/1
2 TABLET ORAL AT BEDTIME
Refills: 0 | Status: DISCONTINUED | OUTPATIENT
Start: 2023-11-16 | End: 2023-11-18

## 2023-11-16 RX ORDER — SODIUM CHLORIDE 9 MG/ML
1000 INJECTION INTRAMUSCULAR; INTRAVENOUS; SUBCUTANEOUS
Refills: 0 | Status: DISCONTINUED | OUTPATIENT
Start: 2023-11-16 | End: 2023-11-18

## 2023-11-16 RX ORDER — KETOROLAC TROMETHAMINE 30 MG/ML
30 SYRINGE (ML) INJECTION ONCE
Refills: 0 | Status: DISCONTINUED | OUTPATIENT
Start: 2023-11-16 | End: 2023-11-16

## 2023-11-16 RX ORDER — OXYCODONE HYDROCHLORIDE 5 MG/1
5 TABLET ORAL EVERY 4 HOURS
Refills: 0 | Status: DISCONTINUED | OUTPATIENT
Start: 2023-11-16 | End: 2023-11-18

## 2023-11-16 RX ORDER — TAMSULOSIN HYDROCHLORIDE 0.4 MG/1
0.4 CAPSULE ORAL AT BEDTIME
Refills: 0 | Status: DISCONTINUED | OUTPATIENT
Start: 2023-11-16 | End: 2023-11-18

## 2023-11-16 RX ORDER — OXYCODONE HYDROCHLORIDE 5 MG/1
10 TABLET ORAL EVERY 4 HOURS
Refills: 0 | Status: DISCONTINUED | OUTPATIENT
Start: 2023-11-16 | End: 2023-11-18

## 2023-11-16 RX ORDER — ACETAMINOPHEN 500 MG
1000 TABLET ORAL ONCE
Refills: 0 | Status: COMPLETED | OUTPATIENT
Start: 2023-11-16 | End: 2023-11-16

## 2023-11-16 RX ORDER — TAMSULOSIN HYDROCHLORIDE 0.4 MG/1
0.4 CAPSULE ORAL ONCE
Refills: 0 | Status: COMPLETED | OUTPATIENT
Start: 2023-11-16 | End: 2023-11-16

## 2023-11-16 RX ADMIN — SODIUM CHLORIDE 100 MILLILITER(S): 9 INJECTION INTRAMUSCULAR; INTRAVENOUS; SUBCUTANEOUS at 23:07

## 2023-11-16 RX ADMIN — Medication 400 MILLIGRAM(S): at 23:06

## 2023-11-16 RX ADMIN — Medication 30 MILLIGRAM(S): at 23:15

## 2023-11-16 RX ADMIN — Medication 30 MILLIGRAM(S): at 20:28

## 2023-11-16 RX ADMIN — SODIUM CHLORIDE 1000 MILLILITER(S): 9 INJECTION INTRAMUSCULAR; INTRAVENOUS; SUBCUTANEOUS at 20:28

## 2023-11-16 NOTE — H&P ADULT - HISTORY OF PRESENT ILLNESS
64yo M pmh MS and polycythemia vera presents to ED c/o R back and flank pain x2d. pt seen in ED for 7mm R proximal renal stone on 11/9/23. pt was DC from ED and f/u with Dr. Walter (uro). pt had repeat CT 2d ago which showed only slight movement of stone. pt reports having renal stone one time 7y ago which was treated with lithotripsy. pt finished course of abx yesterday. pt reports having intractable pain yesterday accompanied by nausea, subjective fevers and sweating. pt reports pain has improved slightly today but still is "very uncomfortable". pt was taking oxycodone and motrin with some relief but hasn't taken any pain meds in last couple of days because ran out of medicine. denies V/D, dysuria, urinary freq/urgency, hematuria, hematochezia/melena, CP, palpitations, SOB

## 2023-11-16 NOTE — H&P ADULT - NSHPPHYSICALEXAM_GEN_ALL_CORE
Constitutional: Well-developed well nourished Male very anxious    Respiratory: Breath sounds CTA b/l respirations are unlabored, no accessory muscle use, no conversational dyspnea    Gastrointestinal: Abdomen soft, non-tender, non-distended, no rebound tenderness / guarding,     ; voiding spontaneously     Neurological: A&O x 3

## 2023-11-16 NOTE — ED PROVIDER NOTE - CLINICAL SUMMARY MEDICAL DECISION MAKING FREE TEXT BOX
62yo M p/w R back and flank pain. pt seen in ED for 7mm R proximal renal stone on 11/9/23. pt was DC from ED and f/u with Dr. Walter (uro). pt had repeat CT 2d ago which showed only slight movement of stone. pt finished course of abx yesterday. pt reports having intractable pain yesterday accompanied by nausea, subjective fevers and sweating. pt reports pain has improved slightly today but still is "very uncomfortable". on eval, non-toxic appearing in some obvious discomfort, abdomen benign, mild R CVAT. VSS, afebrile. pending rectal temp. ordered labs, UA, toradol, flomax and IVF. consulted uro 64yo M p/w R back and flank pain. pt seen in ED for 7mm R proximal renal stone on 11/9/23. pt was DC from ED and f/u with Dr. Walter (uro). pt had repeat CT 2d ago which showed only slight movement of stone. pt finished course of abx yesterday. pt reports having intractable pain yesterday accompanied by nausea, subjective fevers and sweating. pt reports pain has improved slightly today but still is "very uncomfortable". on eval, non-toxic appearing in some obvious discomfort, abdomen benign, mild R CVAT. VSS, afebrile. pending rectal temp. ordered labs, UA, toradol, flomax and IVF. consulted uro- TBA 62yo M p/w R back and flank pain. pt seen in ED for 7mm R proximal renal stone on 11/9/23. pt was DC from ED and f/u with Dr. Walter (uro). pt had repeat CT 2d ago which showed only slight movement of stone. pt finished course of abx yesterday. pt reports having intractable pain yesterday accompanied by nausea, subjective fevers and sweating. pt reports pain has improved slightly today but still is "very uncomfortable". on eval, non-toxic appearing in some obvious discomfort, abdomen benign, mild R CVAT. VSS, afebrile. pending rectal temp. ordered labs, UA, toradol, flomax and IVF. consulted uro- TBA

## 2023-11-16 NOTE — ED ADULT NURSE NOTE - NSFALLUNIVINTERV_ED_ALL_ED
Bed/Stretcher in lowest position, wheels locked, appropriate side rails in place/Call bell, personal items and telephone in reach/Instruct patient to call for assistance before getting out of bed/chair/stretcher/Non-slip footwear applied when patient is off stretcher/[place] to call system/Physically safe environment - no spills, clutter or unnecessary equipment/Purposeful proactive rounding/Room/bathroom lighting operational, light cord in reach Bed/Stretcher in lowest position, wheels locked, appropriate side rails in place/Call bell, personal items and telephone in reach/Instruct patient to call for assistance before getting out of bed/chair/stretcher/Non-slip footwear applied when patient is off stretcher/[place] to call system/Physically safe environment - no spills, clutter or unnecessary equipment/Purposeful proactive rounding/Room/bathroom lighting operational, light cord in reach Bed/Stretcher in lowest position, wheels locked, appropriate side rails in place/Call bell, personal items and telephone in reach/Instruct patient to call for assistance before getting out of bed/chair/stretcher/Non-slip footwear applied when patient is off stretcher/[place] to call system/Physically safe environment - no spills, clutter or unnecessary equipment/Purposeful proactive rounding/Room/bathroom lighting operational, light cord in reach

## 2023-11-16 NOTE — ED PROVIDER NOTE - PROGRESS NOTE DETAILS
reports improvement of pain, does not feel comfortable being DC due to concern of return of pain. pt TBA to urology

## 2023-11-16 NOTE — H&P ADULT - ASSESSMENT
Patient is a 26y old  Female who presents with a chief complaint of n/v    HPI:  27 y/o F with a PMHx DM1, hyperthyroidism, HSV c/o vomitting. Pt reports 4-6 bouts of nonbloody nonbilious vomitting that started at 1am  of 2/27, slightly alleviated by pedialyte and 1 pepto bismol tablet taken ~6am.    Pt states that she is normally compliant with her insulin but she missed her 8pm dose last night because she was putting her baby away, and took it at 6am this morning instead.  She also c/o associated stomach pain at onset and 1 loose, nonbloody BM this morning ~5am.   Pt also admits to recent travel to [facility], where she had "a little cough and congestion," resolving when she came back to the  on 2/19. Pt states that a couple days after her return, the cough and congestion came back.   Admitted to MICU for insulin gtt. Patient given 4L IVFB LR. AG Closed. Bridged with Lantus 20 units BID .  Has h/o DM type 1 ,on admission her a1c is 15%, suggesting non compliance. Endo consulted for same.        PAST MEDICAL & SURGICAL HISTORY:  Diabetes type I    Hyperthyroidism    Herpes simplex virus (HSV) infection    No significant past surgical history        Social History:  Denies smoking cigarettes, any alcohol use.   Sexually active with 1 partner, uses condoms. (27 Feb 2023 17:33)      FAMILY HISTORY:  FH: diabetes mellitus          Allergies    No Known Drug Allergies  shellfish (Urticaria)    Intolerances        REVIEW OF SYSTEMS:    CONSTITUTIONAL: No fever, weight loss, or fatigue  EYES: No eye pain, visual disturbances, or discharge  ENMT:  No difficulty hearing, tinnitus, vertigo; No sinus or throat pain  NECK: No pain or stiffness  RESPIRATORY: No cough, wheezing, chills or hemoptysis; No shortness of breath  CARDIOVASCULAR: No chest pain, palpitations, dizziness, or leg swelling  GASTROINTESTINAL: No abdominal or epigastric pain. No nausea, vomiting, or hematemesis  NEUROLOGICAL: No headaches, memory loss, loss of strength, numbness, or tremors  SKIN: No itching, burning, rashes, or lesions   MUSCULOSKELETAL: No joint pain or swelling; No muscle, back, or extremity pain  PSYCHIATRIC: No depression, anxiety, mood swings, or difficulty sleeping        MEDICATIONS  (STANDING):  calcium gluconate IVPB 2 Gram(s) IV Intermittent every 4 hours  dextrose 5%. 1000 milliLiter(s) (50 mL/Hr) IV Continuous <Continuous>  dextrose 5%. 1000 milliLiter(s) (100 mL/Hr) IV Continuous <Continuous>  dextrose 50% Injectable 25 Gram(s) IV Push once  dextrose 50% Injectable 12.5 Gram(s) IV Push once  dextrose 50% Injectable 25 Gram(s) IV Push once  enoxaparin Injectable 40 milliGRAM(s) SubCutaneous every 24 hours  glucagon  Injectable 1 milliGRAM(s) IntraMuscular once  insulin glargine Injectable (LANTUS) 20 Unit(s) SubCutaneous every morning  insulin glargine Injectable (LANTUS) 20 Unit(s) SubCutaneous at bedtime  insulin lispro (ADMELOG) corrective regimen sliding scale   SubCutaneous three times a day before meals  insulin lispro Injectable (ADMELOG) 5 Unit(s) SubCutaneous three times a day before meals    MEDICATIONS  (PRN):  dextrose Oral Gel 15 Gram(s) Oral once PRN Blood Glucose LESS THAN 70 milliGRAM(s)/deciliter      Vital Signs Last 24 Hrs  T(C): 36.8 (28 Feb 2023 09:46), Max: 37 (28 Feb 2023 05:13)  T(F): 98.2 (28 Feb 2023 09:46), Max: 98.6 (28 Feb 2023 05:13)  HR: 93 (28 Feb 2023 09:46) (93 - 144)  BP: 104/69 (28 Feb 2023 09:46) (94/52 - 126/72)  BP(mean): --  RR: 17 (28 Feb 2023 09:46) (16 - 20)  SpO2: 98% (28 Feb 2023 09:46) (95% - 100%)    Parameters below as of 28 Feb 2023 09:46  Patient On (Oxygen Delivery Method): room air          PHYSICAL EXAM:    Constitutional: NAD, well-groomed, well-developed  HEENT: PERRL, no exophalmos  Neck: No LAD,  no thyroid enlargement  Respiratory: CTAB  Cardiovascular: S1 and S2, RRR, no M/G/R  Gastrointestinal: BS+, soft, no organomegaly or mass  Extremities: No peripheral edema, no pedal lesions  Neurological: A/O x 3, no focal deficits  Psychiatric: Normal mood, normal affect  Skin: No rashes, no acanthosis        LABS  02-28    136  |  103  |  6.5<L>  ----------------------------<  150<H>  3.7   |  21.0<L>  |  0.38<L>    Ca    7.5<L>      28 Feb 2023 05:30  Phos  2.8     02-28  Mg     2.1     02-28    TPro  7.6  /  Alb  3.8  /  TBili  0.7  /  DBili  x   /  AST  23  /  ALT  12  /  AlkPhos  152<H>  02-27                          12.4   6.20  )-----------( 227      ( 28 Feb 2023 05:30 )             38.0       A1C with Estimated Average Glucose Result: 15.1 % (02-28-23 @ 05:30)        Ketone - Urine: Large (02-27 @ 14:27)    Albumin, Serum: 3.8 g/dL (02-27-23 @ 12:38)  Aspartate Aminotransferase (AST/SGOT): 23 U/L (02-27-23 @ 12:38)  Alkaline Phosphatase, Serum: 152 U/L (02-27-23 @ 12:38)  Alanine Aminotransferase (ALT/SGPT): 12 U/L (02-27-23 @ 12:38)      CAPILLARY BLOOD GLUCOSE      POCT Blood Glucose.: 91 mg/dL (28 Feb 2023 11:21)  POCT Blood Glucose.: 150 mg/dL (28 Feb 2023 08:10)  POCT Blood Glucose.: 225 mg/dL (28 Feb 2023 00:31)  POCT Blood Glucose.: 188 mg/dL (27 Feb 2023 21:53)  POCT Blood Glucose.: 138 mg/dL (27 Feb 2023 20:58)  POCT Blood Glucose.: 109 mg/dL (27 Feb 2023 19:55)  POCT Blood Glucose.: >530 mg/dL (27 Feb 2023 19:53)  POCT Blood Glucose.: 103 mg/dL (27 Feb 2023 19:01)  POCT Blood Glucose.: 116 mg/dL (27 Feb 2023 18:09)  POCT Blood Glucose.: 157 mg/dL (27 Feb 2023 17:03)  POCT Blood Glucose.: 225 mg/dL (27 Feb 2023 15:45)  POCT Blood Glucose.: 283 mg/dL (27 Feb 2023 14:13)  POCT Blood Glucose.: 283 mg/dL (27 Feb 2023 12:27)       Patient is a 26y old  Female who presents with a chief complaint of n/v    HPI:  27 y/o F with a PMHx DM1, hyperthyroidism, HSV c/o vomitting. Pt reports 4-6 bouts of nonbloody nonbilious vomitting that started at 1am  of 2/27, slightly alleviated by pedialyte and 1 pepto bismol tablet taken ~6am.    Pt states that she is normally compliant with her insulin but she missed her 8pm dose last night because she was putting her baby away, and took it at 6am this morning instead.  She also c/o associated stomach pain at onset and 1 loose, nonbloody BM this morning ~5am.   Pt also admits to recent travel to [facility], where she had "a little cough and congestion," resolving when she came back to the [place] on 2/19. Pt states that a couple days after her return, the cough and congestion came back.   Admitted to MICU for insulin gtt. Patient given 4L IVFB LR. AG Closed. Bridged with Lantus 20 units BID .  Has h/o DM type 1 ,on admission her a1c is 15%, suggesting non compliance. Endo consulted for same.  Per patient at home she takes basaglar 20 units bid and admelog 10 unts tid with meals, says her sigars are usually high in 200s.        PAST MEDICAL & SURGICAL HISTORY:  Diabetes type I    Hyperthyroidism    Herpes simplex virus (HSV) infection    No significant past surgical history        Social History:  Denies smoking cigarettes, any alcohol use.   Sexually active with 1 partner, uses condoms. (27 Feb 2023 17:33)      FAMILY HISTORY:  FH: diabetes mellitus          Allergies    No Known Drug Allergies  shellfish (Urticaria)    Intolerances        REVIEW OF SYSTEMS:    CONSTITUTIONAL: No fever, weight loss, or fatigue  EYES: No eye pain, visual disturbances, or discharge  ENMT:  No difficulty hearing, tinnitus, vertigo; No sinus or throat pain  NECK: No pain or stiffness  RESPIRATORY: No cough, wheezing, chills or hemoptysis; No shortness of breath  CARDIOVASCULAR: No chest pain, palpitations, dizziness, or leg swelling  GASTROINTESTINAL: No abdominal or epigastric pain. No nausea, vomiting, or hematemesis  NEUROLOGICAL: No headaches, memory loss, loss of strength, numbness, or tremors  SKIN: No itching, burning, rashes, or lesions   MUSCULOSKELETAL: No joint pain or swelling; No muscle, back, or extremity pain  PSYCHIATRIC: No depression, anxiety, mood swings, or difficulty sleeping        MEDICATIONS  (STANDING):  calcium gluconate IVPB 2 Gram(s) IV Intermittent every 4 hours  dextrose 5%. 1000 milliLiter(s) (50 mL/Hr) IV Continuous <Continuous>  dextrose 5%. 1000 milliLiter(s) (100 mL/Hr) IV Continuous <Continuous>  dextrose 50% Injectable 25 Gram(s) IV Push once  dextrose 50% Injectable 12.5 Gram(s) IV Push once  dextrose 50% Injectable 25 Gram(s) IV Push once  enoxaparin Injectable 40 milliGRAM(s) SubCutaneous every 24 hours  glucagon  Injectable 1 milliGRAM(s) IntraMuscular once  insulin glargine Injectable (LANTUS) 20 Unit(s) SubCutaneous every morning  insulin glargine Injectable (LANTUS) 20 Unit(s) SubCutaneous at bedtime  insulin lispro (ADMELOG) corrective regimen sliding scale   SubCutaneous three times a day before meals  insulin lispro Injectable (ADMELOG) 5 Unit(s) SubCutaneous three times a day before meals    MEDICATIONS  (PRN):  dextrose Oral Gel 15 Gram(s) Oral once PRN Blood Glucose LESS THAN 70 milliGRAM(s)/deciliter      Vital Signs Last 24 Hrs  T(C): 36.8 (28 Feb 2023 09:46), Max: 37 (28 Feb 2023 05:13)  T(F): 98.2 (28 Feb 2023 09:46), Max: 98.6 (28 Feb 2023 05:13)  HR: 93 (28 Feb 2023 09:46) (93 - 144)  BP: 104/69 (28 Feb 2023 09:46) (94/52 - 126/72)  BP(mean): --  RR: 17 (28 Feb 2023 09:46) (16 - 20)  SpO2: 98% (28 Feb 2023 09:46) (95% - 100%)    Parameters below as of 28 Feb 2023 09:46  Patient On (Oxygen Delivery Method): room air          PHYSICAL EXAM:    Constitutional: NAD, well-groomed, well-developed  HEENT: PERRL, no exophalmos  Neck: No LAD,  no thyroid enlargement  Respiratory: CTAB  Cardiovascular: S1 and S2, RRR, no M/G/R  Gastrointestinal: BS+, soft, no organomegaly or mass  Extremities: No peripheral edema, no pedal lesions  Neurological: A/O x 3, no focal deficits  Psychiatric: Normal mood, normal affect  Skin: No rashes, no acanthosis        LABS  02-28    136  |  103  |  6.5<L>  ----------------------------<  150<H>  3.7   |  21.0<L>  |  0.38<L>    Ca    7.5<L>      28 Feb 2023 05:30  Phos  2.8     02-28  Mg     2.1     02-28    TPro  7.6  /  Alb  3.8  /  TBili  0.7  /  DBili  x   /  AST  23  /  ALT  12  /  AlkPhos  152<H>  02-27                          12.4   6.20  )-----------( 227      ( 28 Feb 2023 05:30 )             38.0       A1C with Estimated Average Glucose Result: 15.1 % (02-28-23 @ 05:30)        Ketone - Urine: Large (02-27 @ 14:27)    Albumin, Serum: 3.8 g/dL (02-27-23 @ 12:38)  Aspartate Aminotransferase (AST/SGOT): 23 U/L (02-27-23 @ 12:38)  Alkaline Phosphatase, Serum: 152 U/L (02-27-23 @ 12:38)  Alanine Aminotransferase (ALT/SGPT): 12 U/L (02-27-23 @ 12:38)      CAPILLARY BLOOD GLUCOSE      POCT Blood Glucose.: 91 mg/dL (28 Feb 2023 11:21)  POCT Blood Glucose.: 150 mg/dL (28 Feb 2023 08:10)  POCT Blood Glucose.: 225 mg/dL (28 Feb 2023 00:31)  POCT Blood Glucose.: 188 mg/dL (27 Feb 2023 21:53)  POCT Blood Glucose.: 138 mg/dL (27 Feb 2023 20:58)  POCT Blood Glucose.: 109 mg/dL (27 Feb 2023 19:55)  POCT Blood Glucose.: >530 mg/dL (27 Feb 2023 19:53)  POCT Blood Glucose.: 103 mg/dL (27 Feb 2023 19:01)  POCT Blood Glucose.: 116 mg/dL (27 Feb 2023 18:09)  POCT Blood Glucose.: 157 mg/dL (27 Feb 2023 17:03)  POCT Blood Glucose.: 225 mg/dL (27 Feb 2023 15:45)  POCT Blood Glucose.: 283 mg/dL (27 Feb 2023 14:13)  POCT Blood Glucose.: 283 mg/dL (27 Feb 2023 12:27)       Patient is a 62 y/o M with history of MS, polycythemia, and renal stones presenting with right flank pain 2/2 renal stone with associated subjective fevers, chills and nausea. Patient well known to urology service ( Dr. Walter) Patient has been admitted for the same complaint last week and seen by Dr. walter in the outpatient office on 11/15. The definitive plan is for eventual ureteroscopy. Patient was told if the pain is intractable or clinical status changes to come to the ER. Patients pain was able to be controlled with toradol, and labs unremarkable.   Plan:  Patient admitted for pain control, anxious about returning home and not being able to control the pain  IV hydration  NPO after midnight  Flomax  pain control  Urology team to evaluate tomorrow if patient is able to get placed on the schedule for the procedure. If there is any change in clinical status  I.E fevers, hemodynamic instability patient will need emergent or for stent placement  Patient is a 64 y/o M with history of MS, polycythemia, and renal stones presenting with right flank pain 2/2 renal stone with associated subjective fevers, chills and nausea. Patient well known to urology service ( Dr. Walter) Patient has been admitted for the same complaint last week and seen by Dr. walter in the outpatient office on 11/15. The definitive plan is for eventual ureteroscopy. Patient was told if the pain is intractable or clinical status changes to come to the ER. Patients pain was able to be controlled with toradol, and labs unremarkable.   Plan:  Patient admitted for pain control, anxious about returning home and not being able to control the pain  IV hydration  NPO after midnight  Flomax  pain control  Urology team to evaluate tomorrow if patient is able to get placed on the schedule for the procedure. If there is any change in clinical status  I.E fevers, hemodynamic instability patient will need emergent or for stent placement

## 2023-11-16 NOTE — ED ADULT TRIAGE NOTE - CHIEF COMPLAINT QUOTE
pt c/o right flank pain, kidney stone, pain started last Thursday was in ED  A&Ox3, resp wnl, 10/10 PS

## 2023-11-16 NOTE — ED PROVIDER NOTE - ATTENDING APP SHARED VISIT CONTRIBUTION OF CARE
I, Rios Clark, have personally performed a face to face diagnostic evaluation on this patient. I have reviewed the SANDRA note and agree with the history, exam and plan of care, except as noted.    63-year-old male history of MS and renal stone diagnosed on November 9.  Patient has a 7 mm stone.  Patient follow-up with urology with repeat CT scan that showed migration of the stone to the mid ureter.  Patient report that he was having persistent pain, nausea and sweating overnight.  Patient was schedule for possible lithotripsy or ureter stent in December.  Patient reports that he is unable to wait that long secondary to pain.   Blood work showed stable creatinine level at 1.6.  Patient seen by urology, patient admitted to urology service for possible intervention.

## 2023-11-16 NOTE — ED ADULT NURSE NOTE - OBJECTIVE STATEMENT
Pt c/o R flank pain. States he was in the ED last thursday for a kidney stone. States he feels it is not passing. Pt is diaphoretic, c/o fevers/chills/nausea. Denies vomiting, cp, diarrhea.

## 2023-11-16 NOTE — ED PROVIDER NOTE - PHYSICAL EXAMINATION
General: non-toxic appearing, in some obvious discomfort  CV: RRR, nl s1/s2.  Resp: CTAB, normal rate and effort  GI: Abdomen soft, NT, ND. Active bowel sounds. No rebound, no guarding  : mild R CVAT

## 2023-11-16 NOTE — H&P ADULT - NS PANP COMMENT GEN_ALL_CORE FT
Agree w above. Patient w multiple encounters for pain. We agreed to proceed w right ureteroscopy and laser lithotripsy with possible stent.

## 2023-11-16 NOTE — ED PROVIDER NOTE - OBJECTIVE STATEMENT
64yo M pmh MS and polycythemia vera presents to ED c/o R back and flank pain x2d. pt seen in ED for 7mm R proximal renal stone on 11/9/23. pt was DC from ED and f/u with Dr. Walter (uro). pt had repeat CT 2d ago which showed only slight movement of stone. pt reports having renal stone one time 7y ago which was treated with lithotripsy. pt finished course of abx yesterday. pt reports having intractable pain yesterday accompanied by nausea, subjective fevers and sweating. pt reports pain has improved slightly today but still is "very uncomfortable". pt was taking oxycodone and motrin with some relief but hasn't taken any pain meds in last couple of days because ran out of medicine. denies V/D, dysuria, urinary freq/urgency, hematuria, hematochezia/melena, CP, palpitations, SOB 62yo M pmh MS and polycythemia vera presents to ED c/o R back and flank pain x2d. pt seen in ED for 7mm R proximal renal stone on 11/9/23. pt was DC from ED and f/u with Dr. Walter (uro). pt had repeat CT 2d ago which showed only slight movement of stone. pt reports having renal stone one time 7y ago which was treated with lithotripsy. pt finished course of abx yesterday. pt reports having intractable pain yesterday accompanied by nausea, subjective fevers and sweating. pt reports pain has improved slightly today but still is "very uncomfortable". pt was taking oxycodone and motrin with some relief but hasn't taken any pain meds in last couple of days because ran out of medicine. denies V/D, dysuria, urinary freq/urgency, hematuria, hematochezia/melena, CP, palpitations, SOB

## 2023-11-17 ENCOUNTER — RESULT REVIEW (OUTPATIENT)
Age: 63
End: 2023-11-17

## 2023-11-17 LAB
HCV AB S/CO SERPL IA: 0.12 S/CO — SIGNIFICANT CHANGE UP (ref 0–0.99)
HCV AB SERPL-IMP: SIGNIFICANT CHANGE UP

## 2023-11-17 PROCEDURE — 88300 SURGICAL PATH GROSS: CPT | Mod: 26

## 2023-11-17 PROCEDURE — 99232 SBSQ HOSP IP/OBS MODERATE 35: CPT | Mod: FS,25

## 2023-11-17 PROCEDURE — 52356 CYSTO/URETERO W/LITHOTRIPSY: CPT | Mod: RT

## 2023-11-17 DEVICE — GUIDEWIRE NICORE NITINOL STRAIGHT .035" X 150CM: Type: IMPLANTABLE DEVICE | Site: RIGHT | Status: FUNCTIONAL

## 2023-11-17 DEVICE — URETERAL CATH FLEXIMA OPEN END 5FR 70CM: Type: IMPLANTABLE DEVICE | Site: RIGHT | Status: FUNCTIONAL

## 2023-11-17 DEVICE — GUIDEWIRE SENSOR DUAL-FLEX NITINOL ANGLED .038" X 150CM: Type: IMPLANTABLE DEVICE | Site: RIGHT | Status: FUNCTIONAL

## 2023-11-17 DEVICE — URETERAL STENT CONTOUR 6FR 26CM: Type: IMPLANTABLE DEVICE | Site: RIGHT | Status: FUNCTIONAL

## 2023-11-17 DEVICE — URETERAL CATH AXXCESS OPEN END 6FR 70CM: Type: IMPLANTABLE DEVICE | Site: RIGHT | Status: FUNCTIONAL

## 2023-11-17 DEVICE — STONE BASKET ZEROTIP NITINOL 4-WIRE 1.9FR 120CM X 12MM: Type: IMPLANTABLE DEVICE | Site: RIGHT | Status: FUNCTIONAL

## 2023-11-17 DEVICE — LASER FIBER SOLTIVE 200 BALL TIP: Type: IMPLANTABLE DEVICE | Site: RIGHT | Status: FUNCTIONAL

## 2023-11-17 RX ORDER — CEFAZOLIN SODIUM 1 G
2000 VIAL (EA) INJECTION ONCE
Refills: 0 | Status: DISCONTINUED | OUTPATIENT
Start: 2023-11-17 | End: 2023-11-18

## 2023-11-17 RX ORDER — SODIUM CHLORIDE 9 MG/ML
1000 INJECTION, SOLUTION INTRAVENOUS
Refills: 0 | Status: DISCONTINUED | OUTPATIENT
Start: 2023-11-17 | End: 2023-11-18

## 2023-11-17 RX ORDER — CEFAZOLIN SODIUM 1 G
2000 VIAL (EA) INJECTION ONCE
Refills: 0 | Status: DISCONTINUED | OUTPATIENT
Start: 2023-11-17 | End: 2023-11-17

## 2023-11-17 RX ORDER — ONDANSETRON 8 MG/1
4 TABLET, FILM COATED ORAL ONCE
Refills: 0 | Status: DISCONTINUED | OUTPATIENT
Start: 2023-11-17 | End: 2023-11-18

## 2023-11-17 RX ORDER — HYDROMORPHONE HYDROCHLORIDE 2 MG/ML
0.5 INJECTION INTRAMUSCULAR; INTRAVENOUS; SUBCUTANEOUS
Refills: 0 | Status: DISCONTINUED | OUTPATIENT
Start: 2023-11-17 | End: 2023-11-18

## 2023-11-17 RX ADMIN — OXYCODONE HYDROCHLORIDE 5 MILLIGRAM(S): 5 TABLET ORAL at 11:40

## 2023-11-17 RX ADMIN — Medication 1000 MILLIGRAM(S): at 02:15

## 2023-11-17 RX ADMIN — OXYCODONE HYDROCHLORIDE 5 MILLIGRAM(S): 5 TABLET ORAL at 12:35

## 2023-11-17 RX ADMIN — Medication 15 MILLIGRAM(S): at 10:27

## 2023-11-17 RX ADMIN — Medication 15 MILLIGRAM(S): at 11:20

## 2023-11-17 NOTE — PROGRESS NOTE ADULT - NS ATTEND BILL GEN_ALL_CORE
Preanesthetic Assessment





- Anesthesia/Transfusion/Family Hx


Anesthesia History: Prior Anesthesia Without Reaction


Family History of Anesthesia Reaction: No


Transfusion History: No Prior Transfusion(s)





- Review of Systems


General: No Symptoms


Pulmonary: No Symptoms


Cardiovascular: No Symptoms


Gastrointestinal: No Symptoms


Neurological: No Symptoms


Other: Reports: Depression, Anxiety





- Physical Assessment


Respiratory Rate: 18


Vital Signs: 





 Last Vital Signs











Temp  98.7 F   01/05/18 12:22


 


Pulse  84   01/05/18 12:22


 


Resp  18   01/05/18 12:22


 


BP  124/82   01/05/18 12:22


 


Pulse Ox      











Height: 5 ft 9 in


Weight: 98.883 kg


ASA Class: 2


Mental Status: Alert & Oriented x3


Airway Class: Mallampati = 1


Dentition: Reports: Normal Dentition


Thyro-Mental Finger Breadths: 3


Mouth Opening Finger Breadths: 3


ROM/Head Extension: Full


Lungs: Clear to Auscultation, Normal Respiratory Effort


Cardiovascular: Regular Rate, Regular Rhythm





- Lab


Values: 





 Laboratory Last Values











WBC  8.06 K/mm3 (3.98-10.04)   01/05/18  12:53    


 


RBC  4.03 M/mm3 (3.98-5.22)   01/05/18  12:53    


 


Hgb  11.8 gm/L (11.2-15.7)   01/05/18  12:53    


 


Hct  35.2 % (34.1-44.9)   01/05/18  12:53    


 


MCV  87.3 fl (79.4-94.8)   01/05/18  12:53    


 


MCH  29.3 pg (25.6-32.2)   01/05/18  12:53    


 


MCHC  33.5 g/dl (32.2-35.5)   01/05/18  12:53    


 


RDW Std Deviation  40.9 fL (36.4-46.3)   01/05/18  12:53    


 


Plt Count  215 K/mm3 (182-369)   01/05/18  12:53    


 


MPV  9.3 fl (9.4-12.3)  L  01/05/18  12:53    


 


Neut % (Auto)  78.0 % (34.0-71.1)  H  01/05/18  12:53    


 


Lymph % (Auto)  16.5 % (19.3-51.7)  L  01/05/18  12:53    


 


Mono % (Auto)  4.5 % (4.7-12.5)  L  01/05/18  12:53    


 


Eos % (Auto)  0.6  (0.7-5.8)  L  01/05/18  12:53    


 


Baso % (Auto)  0.0 % (0.1-1.2)  L  01/05/18  12:53    


 


Neut # (Auto)  6.29 K/mm3 (1.56-6.13)  H  01/05/18  12:53    


 


Lymph # (Auto)  1.33 K/mm3 (1.18-3.74)   01/05/18  12:53    


 


Mono # (Auto)  0.36 K/mm3 (0.24-0.36)   01/05/18  12:53    


 


Eos # (Auto)  0.05 K/mm3 (0.04-0.36)   01/05/18  12:53    


 


Baso # (Auto)  0.00 K/mm3 (0.01-0.08)  L  01/05/18  12:53    














- Allergies


Allergies/Adverse Reactions: 


 Allergies











Allergy/AdvReac Type Severity Reaction Status Date / Time


 


No Known Allergies Allergy   Verified 10/25/16 15:13














- Acknowledgements


Anesthesia Type Planned: Epidural


Pt an Appropriate Candidate for the Planned Anesthesia: Yes


Alternatives and Risks of Anesthesia Discussed w Pt/Guardian: Yes


Pt/Guardian Understands and Agrees with Anesthesia Plan: Yes





PreAnesthesia Questionnaire


Other HEENT History: wears glasses


OB/GYN History: Reports: Pregnancy


Psychiatric History: Reports: Anxiety, Depression





- Past Surgical History


HEENT Surgical History: Reports: Tonsillectomy





- SUBSTANCE USE


Smoking Status *Q: Never Smoker


Second Hand Smoke Exposure: No


Recreational Drug Use History: No





- HOME MEDS


Home Medications: 


 Home Meds





Sertraline [Zoloft] 50 mg PO DAILY 10/25/16 [History]











- CURRENT (IN HOUSE) MEDS


Current Meds: 





 Current Medications





Diphenhydramine HCl (Benadryl)  25 mg IVPUSH Q6H PRN


   PRN Reason: pruritis


Ephedrine Sulfate (Ephedrine Sulfate)  5 mg IVPUSH ASDIRECTED PRN


   PRN Reason: Hypotension


Fentanyl (Sublimaze)  100 mcg EPIDUR Q3H PRN


   PRN Reason: Pain


Fentanyl/Bupivacaine HCl (Fentanyl/Bupivacaine/Ns 2 Mcg-0.125% 100 Ml)  100 ml 

EPIDUR ASDIRECTED GIDEON


Lactated Ringer's (Ringers, Lactated)  1,000 mls @ 100 mls/hr IV ASDIRECTED GIDEON


   Last Admin: 01/05/18 23:45 Dose:  100 mls/hr


Oxytocin/Lactated Ringer's (Pitocin In Lr 10 Units/1,000 Ml)  10 unit in 1,000 

mls @ 100 mls/hr IV .CONTINUOUS GIDEON


   PRN Reason: Protocol


Oxytocin/Lactated Ringer's (Pitocin In Lr 10 Units/1,000 Ml)  10 unit in 1,000 

mls @ 12 mls/hr IV TITRATE GIDEON; 2 MUNITS/MIN


   PRN Reason: Protocol


   Last Titration: 01/05/18 20:35 Dose:  12 munits/min, 72 mls/hr


Nalbuphine HCl (Nubain)  10 mg IVPUSH Q2H PRN


   PRN Reason: Pain (moderate 4-6)


Ondansetron HCl (Zofran)  4 mg IVPUSH Q4H PRN


   PRN Reason: Nausea/Vomiting


Sodium Chloride (Saline Flush)  10 ml FLUSH ASDIRECTED PRN


   PRN Reason: Keep Vein Open
Attending to bill

## 2023-11-17 NOTE — PATIENT PROFILE ADULT - FALL HARM RISK - UNIVERSAL INTERVENTIONS
Bed in lowest position, wheels locked, appropriate side rails in place/Call bell, personal items and telephone in reach/Instruct patient to call for assistance before getting out of bed or chair/Non-slip footwear when patient is out of bed/[place] to call system/Physically safe environment - no spills, clutter or unnecessary equipment/Purposeful Proactive Rounding/Room/bathroom lighting operational, light cord in reach Bed in lowest position, wheels locked, appropriate side rails in place/Call bell, personal items and telephone in reach/Instruct patient to call for assistance before getting out of bed or chair/Non-slip footwear when patient is out of bed/[place] to call system/Physically safe environment - no spills, clutter or unnecessary equipment/Purposeful Proactive Rounding/Room/bathroom lighting operational, light cord in reach Bed in lowest position, wheels locked, appropriate side rails in place/Call bell, personal items and telephone in reach/Instruct patient to call for assistance before getting out of bed or chair/Non-slip footwear when patient is out of bed/[place] to call system/Physically safe environment - no spills, clutter or unnecessary equipment/Purposeful Proactive Rounding/Room/bathroom lighting operational, light cord in reach

## 2023-11-17 NOTE — BRIEF OPERATIVE NOTE - NSICDXBRIEFPROCEDURE_GEN_ALL_CORE_FT
PROCEDURES:  Cystourethroscopy with right ureteroscopy with lithotripsy using holmium laser 17-Nov-2023 21:46:17  Sang Walter  Cystoscopic insertion of right ureteral stent 17-Nov-2023 21:46:25  Sang Walter

## 2023-11-18 ENCOUNTER — TRANSCRIPTION ENCOUNTER (OUTPATIENT)
Age: 63
End: 2023-11-18

## 2023-11-18 VITALS
SYSTOLIC BLOOD PRESSURE: 145 MMHG | HEART RATE: 61 BPM | DIASTOLIC BLOOD PRESSURE: 74 MMHG | TEMPERATURE: 98 F | RESPIRATION RATE: 18 BRPM | OXYGEN SATURATION: 96 %

## 2023-11-18 LAB
CULTURE RESULTS: SIGNIFICANT CHANGE UP
SPECIMEN SOURCE: SIGNIFICANT CHANGE UP

## 2023-11-18 PROCEDURE — 99232 SBSQ HOSP IP/OBS MODERATE 35: CPT | Mod: FS

## 2023-11-18 RX ORDER — TAMSULOSIN HYDROCHLORIDE 0.4 MG/1
0.8 CAPSULE ORAL AT BEDTIME
Refills: 0 | Status: DISCONTINUED | OUTPATIENT
Start: 2023-11-18 | End: 2023-11-18

## 2023-11-18 RX ORDER — OXYBUTYNIN CHLORIDE 5 MG
1 TABLET ORAL
Qty: 14 | Refills: 0
Start: 2023-11-18 | End: 2023-11-24

## 2023-11-18 RX ORDER — PHENAZOPYRIDINE HCL 100 MG
100 TABLET ORAL EVERY 8 HOURS
Refills: 0 | Status: DISCONTINUED | OUTPATIENT
Start: 2023-11-18 | End: 2023-11-18

## 2023-11-18 RX ORDER — OXYCODONE HYDROCHLORIDE 5 MG/1
1 TABLET ORAL
Qty: 9 | Refills: 0
Start: 2023-11-18 | End: 2023-11-20

## 2023-11-18 RX ORDER — OXYBUTYNIN CHLORIDE 5 MG
10 TABLET ORAL DAILY
Refills: 0 | Status: DISCONTINUED | OUTPATIENT
Start: 2023-11-18 | End: 2023-11-18

## 2023-11-18 RX ORDER — OXYBUTYNIN CHLORIDE 5 MG
5 TABLET ORAL
Refills: 0 | Status: DISCONTINUED | OUTPATIENT
Start: 2023-11-18 | End: 2023-11-18

## 2023-11-18 RX ORDER — TAMSULOSIN HYDROCHLORIDE 0.4 MG/1
1 CAPSULE ORAL
Qty: 30 | Refills: 0
Start: 2023-11-18 | End: 2023-12-17

## 2023-11-18 RX ADMIN — Medication 5 MILLIGRAM(S): at 16:52

## 2023-11-18 RX ADMIN — OXYCODONE HYDROCHLORIDE 5 MILLIGRAM(S): 5 TABLET ORAL at 02:16

## 2023-11-18 RX ADMIN — OXYCODONE HYDROCHLORIDE 10 MILLIGRAM(S): 5 TABLET ORAL at 16:53

## 2023-11-18 RX ADMIN — TAMSULOSIN HYDROCHLORIDE 0.4 MILLIGRAM(S): 0.4 CAPSULE ORAL at 00:19

## 2023-11-18 RX ADMIN — OXYCODONE HYDROCHLORIDE 5 MILLIGRAM(S): 5 TABLET ORAL at 08:34

## 2023-11-18 RX ADMIN — OXYCODONE HYDROCHLORIDE 10 MILLIGRAM(S): 5 TABLET ORAL at 12:39

## 2023-11-18 RX ADMIN — OXYCODONE HYDROCHLORIDE 5 MILLIGRAM(S): 5 TABLET ORAL at 03:00

## 2023-11-18 RX ADMIN — OXYCODONE HYDROCHLORIDE 10 MILLIGRAM(S): 5 TABLET ORAL at 12:22

## 2023-11-18 RX ADMIN — OXYCODONE HYDROCHLORIDE 5 MILLIGRAM(S): 5 TABLET ORAL at 08:07

## 2023-11-18 NOTE — DISCHARGE NOTE PROVIDER - NSDCMRMEDTOKEN_GEN_ALL_CORE_FT
oxyBUTYnin 5 mg oral tablet: 1 tab(s) orally 2 times a day  oxyCODONE 5 mg oral tablet: 1 tab(s) orally every 8 hours as needed for Moderate Pain (4 - 6) MDD: 2  tamsulosin 0.4 mg oral capsule: 1 cap(s) orally once a day (at bedtime)

## 2023-11-18 NOTE — DISCHARGE NOTE NURSING/CASE MANAGEMENT/SOCIAL WORK - PATIENT PORTAL LINK FT
You can access the FollowMyHealth Patient Portal offered by [facility] by registering at the following website: [email or web address]. By joining [email or web address]’s FollowMyHealth portal, you will also be able to view your health information using other applications (apps) compatible with our system. You can access the FollowMyHealth Patient Portal offered by [facility] by registering at the following website: [email or web address]. By joining [employer]’s FollowMyHealth portal, you will also be able to view your health information using other applications (apps) compatible with our system. You can access the FollowMyHealth Patient Portal offered by [facility] by registering at the following website: [email or web address]. By joining [employer]’s FollowMyHealth portal, you will also be able to view your health information using other applications (apps) compatible with our system.

## 2023-11-18 NOTE — PROGRESS NOTE ADULT - SUBJECTIVE AND OBJECTIVE BOX
UROLOGY F/U:    Above notes noted.  Patient seen and examined this am   NPO since midnight  Awake, alert, responsive resting ion bed , no acute changes- pain better for short period then soon medication wears off and increased discomfort.    Vital Signs Last 24 Hrs  T(C): 36.6 (17 Nov 2023 07:46), Max: 37.7 (16 Nov 2023 20:14)  T(F): 97.9 (17 Nov 2023 07:46), Max: 99.9 (16 Nov 2023 20:14)  HR: 57 (17 Nov 2023 07:46) (55 - 81)  BP: 165/87 (17 Nov 2023 07:46) (115/74 - 167/82)  BP(mean): --  RR: 18 (17 Nov 2023 07:46) (16 - 19)  SpO2: 96% (17 Nov 2023 07:46) (95% - 98%)    Parameters below as of 17 Nov 2023 07:46  Patient On (Oxygen Delivery Method): room air    Abdomen:  soft n/d, n/t no supra pubic discomfort   Back/Flank: right sided pain lower back region no renal tenderness at present on palpation.      Voiding freely - no luna.     Labs:  CBC:                        15.3   9.05  )-----------( 341      ( 16 Nov 2023 20:33 )             44.8     Chemistry:  11-16    134<L>  |  99  |  25.8<H>  ----------------------------<  122<H>  4.2   |  25.0  |  1.64<H>      Urinalysis + Microscopic Examination (11.16.23 @ 20:33)    pH Urine: 5.5   Urine Appearance: Clear   Color: Yellow   Specific Gravity: 1.024   Protein, Urine: Trace mg/dL   Glucose Qualitative, Urine: Negative mg/dL   Ketone - Urine: Trace mg/dL   Blood, Urine: Trace   Bilirubin: Negative   Urobilinogen: 1.0 mg/dL   Leukocyte Esterase Concentration: Negative   Nitrite: Negative   White Blood Cell - Urine: 0 /HPF   Red Blood Cell - Urine: 1 /HPF   Bacteria: Negative /HPF   Cast: 1 /LPF   Epithelial Cells: 0 /HPF    Culture - Urine (11.10.23 @ 00:00)    Specimen Source: Clean Catch Clean Catch (Midstream)   Culture Results:   No growth    Impression: right renal colic, 7mm ureteral stone with recurrent intermittent intractable pain with associated nausea and vomiting, seen by Dr. Walter recently and just seen in ER 2 days prior to this presentation.   Plan:  Discussed with patient present situation and explained will be added to the schedule with OR most likely late afternoon possible early evening .  If [facility] opens early it is possible to go earlier- he understands .      
Subjective: Patient seen and evaluated at bedside this morning. S/P POD 1 right ureteroscopy with stent placement. Patient complaining of burning sensation and pain when urinating 9/10. Patient states " it feels like razor blades". having some hematuria, fruit punch color but lightening slowly. Patient states having good oral intake of fluids. Patient denies abdominal pain, back pain, N/V/D, fever/ chills, CP, SOB.       Vital Signs Last 24 Hrs  T(C): 36.9 (18 Nov 2023 12:00), Max: 37.7 (17 Nov 2023 19:24)  T(F): 98.4 (18 Nov 2023 12:00), Max: 99.8 (17 Nov 2023 19:24)  HR: 64 (18 Nov 2023 12:00) (53 - 75)  BP: 154/80 (18 Nov 2023 12:00) (107/56 - 176/91)  BP(mean): 113 (17 Nov 2023 23:30) (68 - 113)  RR: 18 (18 Nov 2023 12:00) (11 - 18)  SpO2: 95% (18 Nov 2023 12:00) (95% - 100%)    Parameters below as of 18 Nov 2023 12:00  Patient On (Oxygen Delivery Method): room air      I&O's Detail    17 Nov 2023 07:01  -  18 Nov 2023 07:00  --------------------------------------------------------  IN:  Total IN: 0 mL    OUT:    Voided (mL): 500 mL  Total OUT: 500 mL    Total NET: -500 mL      18 Nov 2023 07:01  -  18 Nov 2023 12:49  --------------------------------------------------------  IN:  Total IN: 0 mL    OUT:    Voided (mL): 150 mL  Total OUT: 150 mL    Total NET: -150 mL

## 2023-11-18 NOTE — DISCHARGE NOTE PROVIDER - NSDCCPTREATMENT_GEN_ALL_CORE_FT
PRINCIPAL PROCEDURE  Procedure: Cystourethroscopy with right ureteroscopy with lithotripsy using holmium laser  Findings and Treatment: BATHING: Please do not submerge wound underwater. You may shower and/or sponge bathe.   ACTIVITY: No heavy lifting or straining. Otherwise, you may return to your usual level of physical activity. If you are taking narcotic pain medication (such as Percocet) DO NOT drive a car, operate machinery or make important decisions.  DIET: please resume normal diet and increase oral intake of fluids.   Medication: please taken medications as they have been prescribed to you.   Patient is advised to RETURN TO THE EMERGENCY DEPARTMENT for any of the following - worsening pain, fever/chills, nausea/vomiting, altered mental status, chest pain, shortness of breath, or any other new / worsening symptom. to your usual diet.  NOTIFY YOUR SURGEON IF: You have any bleeding that does not stop, any pus draining from your wound(s), any fever (over 100.4 F) or chills, persistent nausea/vomiting, persistent diarrhea, or if your pain is not controlled on your discharge pain medications.  FOLLOW-UP: Please follow up with your urologist in 1 week for stent removal in office.

## 2023-11-18 NOTE — DISCHARGE NOTE PROVIDER - HOSPITAL COURSE
HPI:   64yo M pmh MS and polycythemia vera presents to ED c/o R back and flank pain x2d. pt seen in ED for 7mm R proximal renal stone on 11/9/23. pt was DC from ED and f/u with Dr. Walter (uro). pt had repeat CT 2d ago which showed only slight movement of stone. pt reports having renal stone one time 7y ago which was treated with lithotripsy. pt finished course of abx yesterday. pt reports having intractable pain yesterday accompanied by nausea, subjective fevers and sweating. pt reports pain has improved slightly today but still is "very uncomfortable". pt was taking oxycodone and motrin with some relief but hasn't taken any pain meds in last couple of days because ran out of medicine. denies V/D, dysuria, urinary freq/urgency, hematuria, hematochezia/melena, CP, palpitations, SOB (16 Nov 2023 23:05)    Hospital course:   Patient admitted to urology service on 11/16 for R ureteral stone. Patient had Right urethroscopy with stent placement on 11/17 with Dr. Walter urologist. Patient did well post op, complaining of slight pain with urination that has since resolved, some hematuria that has also resolved. Patient currently hemodynamically stable, pain controlled, voiding well on own, having good PO intake. Patient is stable for DC home.    HPI:   62yo M pmh MS and polycythemia vera presents to ED c/o R back and flank pain x2d. pt seen in ED for 7mm R proximal renal stone on 11/9/23. pt was DC from ED and f/u with Dr. Walter (uro). pt had repeat CT 2d ago which showed only slight movement of stone. pt reports having renal stone one time 7y ago which was treated with lithotripsy. pt finished course of abx yesterday. pt reports having intractable pain yesterday accompanied by nausea, subjective fevers and sweating. pt reports pain has improved slightly today but still is "very uncomfortable". pt was taking oxycodone and motrin with some relief but hasn't taken any pain meds in last couple of days because ran out of medicine. denies V/D, dysuria, urinary freq/urgency, hematuria, hematochezia/melena, CP, palpitations, SOB (16 Nov 2023 23:05)    Hospital course:   Patient admitted to urology service on 11/16 for R ureteral stone. Patient had Right urethroscopy with stent placement on 11/17 with Dr. Walter urologist. Patient did well post op, complaining of slight pain with urination that has since resolved, some hematuria that has also resolved. Patient currently hemodynamically stable, pain controlled, voiding well on own, having good PO intake. Patient is stable for DC home.

## 2023-11-18 NOTE — PROGRESS NOTE ADULT - ASSESSMENT
64 yo male s/p right urteroscopy with stent placement on 11/17. Patient complaining of pain post op. good oral intake of fluids, urine is fruit punch in color, no clots, no retention, no abdominal pressure/ fullness.     Plan:   -continue oral intake of fluids  -pain control   -Flomax  -Oxybutynin 10   -pyridium 100mg q8 for dysuria  62 yo male s/p right urteroscopy with stent placement on 11/17. Patient complaining of pain post op. good oral intake of fluids, urine is fruit punch in color, no clots, no retention, no abdominal pressure/ fullness.     Plan:   -continue oral intake of fluids  -pain control   -Flomax  -Oxybutynin 10   -pyridium 100mg q8 for dysuria

## 2023-11-18 NOTE — DISCHARGE NOTE NURSING/CASE MANAGEMENT/SOCIAL WORK - NSDCPEFALRISK_GEN_ALL_CORE
For information on Fall & Injury Prevention, visit: [email or web address]/[email or web address]-mobility OR  [email or web address]://[email or web address]/[email or web address]-[email or web address]-tips-[email or web address]-avoid-injury OR  https://www.cdc.gov/steadi/patient.html For information on Fall & Injury Prevention, visit: [email or web address]://www.[email or web address]/[email or web address]-prevention-protects-[email or web address]-[email or web address]-health-[email or web address]-mobility OR  https://www.[facility]/news/fall-prevention-tips-to-avoid-injury OR  https://www.cdc.gov/steadi/patient.html For information on Fall & Injury Prevention, visit: [email or web address]://www.[email or web address]/news/fall-prevention-[email or web address]-[email or web address]-health-[email or web address]-mobility OR  https://www.[email or web address]/news/fall-prevention-tips-to-avoid-injury OR  https://www.cdc.gov/steadi/patient.html

## 2023-11-18 NOTE — DISCHARGE NOTE PROVIDER - CARE PROVIDER_API CALL
Sang Walter  Urology  [address], [address]  [place] [address]  Phone: [phone]  Fax: [phone]  Follow Up Time: 1 week   Sang Walter  Urology  [address], [address]  [place] [address]  Phone: [phone]  Fax: [phone]  Follow Up Time: 1 week   Sagn Walter  Urology  [address], [address]  [place] [address]  Phone: [phone]  Fax: [phone]  Follow Up Time: 1 week

## 2023-11-18 NOTE — DISCHARGE NOTE PROVIDER - PROVIDER TOKENS
PROVIDER:[TOKEN:[[id number]],FOLLOWUP:[1 week]] PROVIDER:[TOKEN:[[id number]],FOLLOWUP:[1 week]] PROVIDER:[TOKEN:[[id number]],FOLLOWUP:[1 week]]

## 2023-11-19 RX ORDER — OXYBUTYNIN CHLORIDE 5 MG
1 TABLET ORAL
Qty: 14 | Refills: 0
Start: 2023-11-19 | End: 2023-11-25

## 2023-11-19 RX ORDER — TAMSULOSIN HYDROCHLORIDE 0.4 MG/1
1 CAPSULE ORAL
Qty: 30 | Refills: 0
Start: 2023-11-19 | End: 2023-12-18

## 2023-11-19 RX ORDER — NALOXONE HYDROCHLORIDE 4 MG/.1ML
4 SPRAY NASAL
Qty: 1 | Refills: 0
Start: 2023-11-19

## 2023-11-19 RX ORDER — OXYCODONE HYDROCHLORIDE 5 MG/1
1 TABLET ORAL
Qty: 9 | Refills: 0
Start: 2023-11-19 | End: 2023-11-21

## 2023-11-24 ENCOUNTER — APPOINTMENT (OUTPATIENT)
Dept: UROLOGY | Facility: CLINIC | Age: 63
End: 2023-11-24
Payer: MEDICARE

## 2023-11-24 LAB
BILIRUB UR QL STRIP: NORMAL
CLARITY UR: CLEAR
COLLECTION METHOD: NORMAL
GLUCOSE UR-MCNC: NORMAL
HCG UR QL: 0.2 EU/DL
HGB UR QL STRIP.AUTO: ABNORMAL
KETONES UR-MCNC: ABNORMAL
LEUKOCYTE ESTERASE UR QL STRIP: ABNORMAL
NITRITE UR QL STRIP: NORMAL
PH UR STRIP: 6
PROT UR STRIP-MCNC: ABNORMAL
SP GR UR STRIP: 1.01

## 2023-11-24 PROCEDURE — 52310 CYSTOSCOPY AND TREATMENT: CPT

## 2023-11-24 PROCEDURE — 81002 URINALYSIS NONAUTO W/O SCOPE: CPT

## 2023-11-27 LAB
SURGICAL PATHOLOGY STUDY: SIGNIFICANT CHANGE UP

## 2023-12-01 LAB
CELL MATERIAL STONE EST-MCNT: SIGNIFICANT CHANGE UP
LABORATORY COMMENT REPORT: SIGNIFICANT CHANGE UP
NIDUS STONE QN: SIGNIFICANT CHANGE UP

## 2023-12-13 PROCEDURE — 80053 COMPREHEN METABOLIC PANEL: CPT

## 2023-12-13 PROCEDURE — 85730 THROMBOPLASTIN TIME PARTIAL: CPT

## 2023-12-13 PROCEDURE — C1758: CPT

## 2023-12-13 PROCEDURE — 76000 FLUOROSCOPY <1 HR PHYS/QHP: CPT

## 2023-12-13 PROCEDURE — 86900 BLOOD TYPING SEROLOGIC ABO: CPT

## 2023-12-13 PROCEDURE — 81001 URINALYSIS AUTO W/SCOPE: CPT

## 2023-12-13 PROCEDURE — 96374 THER/PROPH/DIAG INJ IV PUSH: CPT

## 2023-12-13 PROCEDURE — 99285 EMERGENCY DEPT VISIT HI MDM: CPT | Mod: 25

## 2023-12-13 PROCEDURE — C1769: CPT

## 2023-12-13 PROCEDURE — 86850 RBC ANTIBODY SCREEN: CPT

## 2023-12-13 PROCEDURE — 86901 BLOOD TYPING SEROLOGIC RH(D): CPT

## 2023-12-13 PROCEDURE — 88300 SURGICAL PATH GROSS: CPT

## 2023-12-13 PROCEDURE — 82365 CALCULUS SPECTROSCOPY: CPT

## 2023-12-13 PROCEDURE — 36415 COLL VENOUS BLD VENIPUNCTURE: CPT

## 2023-12-13 PROCEDURE — 85610 PROTHROMBIN TIME: CPT

## 2023-12-13 PROCEDURE — C1889: CPT

## 2023-12-13 PROCEDURE — 85025 COMPLETE CBC W/AUTO DIFF WBC: CPT

## 2023-12-13 PROCEDURE — 87086 URINE CULTURE/COLONY COUNT: CPT

## 2023-12-13 PROCEDURE — C2617: CPT

## 2023-12-13 PROCEDURE — 86803 HEPATITIS C AB TEST: CPT

## 2024-01-11 RX ORDER — ASPIRIN/CALCIUM CARB/MAGNESIUM 324 MG
1 TABLET ORAL
Refills: 0 | DISCHARGE

## 2024-01-11 RX ORDER — IBUPROFEN 200 MG
3 TABLET ORAL
Qty: 0 | Refills: 0 | DISCHARGE

## 2024-01-31 ENCOUNTER — APPOINTMENT (OUTPATIENT)
Dept: UROLOGY | Facility: CLINIC | Age: 64
End: 2024-01-31

## 2025-04-11 NOTE — PATIENT PROFILE ADULT - FUNCTIONAL ASSESSMENT - BASIC MOBILITY 5.
Steve called. Says he had a seizure last night and went into ER. They changed his medication and told him to contact Dr. Kasper to follow up with her. Would like a call back.    4 = No assist / stand by assistance

## (undated) DEVICE — WARMING BLANKET UPPER ADULT

## (undated) DEVICE — VENODYNE/SCD SLEEVE CALF MEDIUM

## (undated) DEVICE — SYR LUER LOK 10CC

## (undated) DEVICE — GLV 8 PROTEXIS (WHITE)

## (undated) DEVICE — VISITEC 4X4

## (undated) DEVICE — TUBING IRR SET FOR CYSTOSCOPY 77"

## (undated) DEVICE — PREP BETADINE SPONGE STICKS

## (undated) DEVICE — DRAPE C ARM UNIVERSAL

## (undated) DEVICE — Device

## (undated) DEVICE — SOL IRR BAG NS 0.9% 3000ML

## (undated) DEVICE — PACK CYSTOSCOPY TIBURON

## (undated) DEVICE — TUBING TUR 2 PRONG